# Patient Record
Sex: MALE | Race: ASIAN | NOT HISPANIC OR LATINO | Employment: OTHER | URBAN - METROPOLITAN AREA
[De-identification: names, ages, dates, MRNs, and addresses within clinical notes are randomized per-mention and may not be internally consistent; named-entity substitution may affect disease eponyms.]

---

## 2017-02-07 ENCOUNTER — HOSPITAL ENCOUNTER (OUTPATIENT)
Dept: LAB | Facility: MEDICAL CENTER | Age: 67
End: 2017-02-07
Attending: PHYSICIAN ASSISTANT
Payer: MEDICARE

## 2017-02-07 LAB
EST. AVERAGE GLUCOSE BLD GHB EST-MCNC: 148 MG/DL
HBA1C MFR BLD: 6.8 % (ref 0–5.6)
HCV AB S/CO SERPL IA: NEGATIVE
PSA SERPL DL<=0.01 NG/ML-MCNC: 1.16 NG/ML (ref 0–4)

## 2017-02-07 PROCEDURE — 86803 HEPATITIS C AB TEST: CPT

## 2017-02-07 PROCEDURE — 84153 ASSAY OF PSA TOTAL: CPT

## 2017-02-07 PROCEDURE — 36415 COLL VENOUS BLD VENIPUNCTURE: CPT

## 2017-02-07 PROCEDURE — 83036 HEMOGLOBIN GLYCOSYLATED A1C: CPT

## 2017-02-11 ENCOUNTER — HOSPITAL ENCOUNTER (OUTPATIENT)
Facility: MEDICAL CENTER | Age: 67
End: 2017-02-11
Attending: PHYSICIAN ASSISTANT
Payer: MEDICARE

## 2017-02-11 PROCEDURE — 82274 ASSAY TEST FOR BLOOD FECAL: CPT

## 2017-02-15 LAB — HEMOCCULT STL QL IA: NEGATIVE

## 2017-10-06 ENCOUNTER — HOSPITAL ENCOUNTER (OUTPATIENT)
Dept: LAB | Facility: MEDICAL CENTER | Age: 67
End: 2017-10-06
Attending: FAMILY MEDICINE
Payer: MEDICARE

## 2017-10-06 LAB
ALBUMIN SERPL BCP-MCNC: 4 G/DL (ref 3.2–4.9)
ALBUMIN/GLOB SERPL: 1.3 G/DL
ALP SERPL-CCNC: 70 U/L (ref 30–99)
ALT SERPL-CCNC: 18 U/L (ref 2–50)
ANION GAP SERPL CALC-SCNC: 8 MMOL/L (ref 0–11.9)
APPEARANCE UR: CLEAR
AST SERPL-CCNC: 19 U/L (ref 12–45)
BASOPHILS # BLD AUTO: 1.9 % (ref 0–1.8)
BASOPHILS # BLD: 0.11 K/UL (ref 0–0.12)
BILIRUB SERPL-MCNC: 0.6 MG/DL (ref 0.1–1.5)
BILIRUB UR QL STRIP.AUTO: NEGATIVE
BUN SERPL-MCNC: 17 MG/DL (ref 8–22)
CALCIUM SERPL-MCNC: 9.6 MG/DL (ref 8.5–10.5)
CHLORIDE SERPL-SCNC: 105 MMOL/L (ref 96–112)
CHOLEST SERPL-MCNC: 148 MG/DL (ref 100–199)
CO2 SERPL-SCNC: 26 MMOL/L (ref 20–33)
COLOR UR: YELLOW
CREAT SERPL-MCNC: 0.88 MG/DL (ref 0.5–1.4)
CREAT UR-MCNC: 176.1 MG/DL
EOSINOPHIL # BLD AUTO: 0.28 K/UL (ref 0–0.51)
EOSINOPHIL NFR BLD: 4.7 % (ref 0–6.9)
ERYTHROCYTE [DISTWIDTH] IN BLOOD BY AUTOMATED COUNT: 50.5 FL (ref 35.9–50)
EST. AVERAGE GLUCOSE BLD GHB EST-MCNC: 163 MG/DL
GFR SERPL CREATININE-BSD FRML MDRD: >60 ML/MIN/1.73 M 2
GLOBULIN SER CALC-MCNC: 3.1 G/DL (ref 1.9–3.5)
GLUCOSE SERPL-MCNC: 109 MG/DL (ref 65–99)
GLUCOSE UR STRIP.AUTO-MCNC: >=1000 MG/DL
HBA1C MFR BLD: 7.3 % (ref 0–5.6)
HCT VFR BLD AUTO: 43.6 % (ref 42–52)
HDLC SERPL-MCNC: 39 MG/DL
HGB BLD-MCNC: 13.2 G/DL (ref 14–18)
IMM GRANULOCYTES # BLD AUTO: 0.01 K/UL (ref 0–0.11)
IMM GRANULOCYTES NFR BLD AUTO: 0.2 % (ref 0–0.9)
KETONES UR STRIP.AUTO-MCNC: NEGATIVE MG/DL
LDLC SERPL CALC-MCNC: 75 MG/DL
LEUKOCYTE ESTERASE UR QL STRIP.AUTO: NEGATIVE
LYMPHOCYTES # BLD AUTO: 2.24 K/UL (ref 1–4.8)
LYMPHOCYTES NFR BLD: 37.7 % (ref 22–41)
MCH RBC QN AUTO: 24.9 PG (ref 27–33)
MCHC RBC AUTO-ENTMCNC: 30.3 G/DL (ref 33.7–35.3)
MCV RBC AUTO: 82.3 FL (ref 81.4–97.8)
MICRO URNS: ABNORMAL
MICROALBUMIN UR-MCNC: 1.2 MG/DL
MICROALBUMIN/CREAT UR: 7 MG/G (ref 0–30)
MONOCYTES # BLD AUTO: 0.42 K/UL (ref 0–0.85)
MONOCYTES NFR BLD AUTO: 7.1 % (ref 0–13.4)
NEUTROPHILS # BLD AUTO: 2.88 K/UL (ref 1.82–7.42)
NEUTROPHILS NFR BLD: 48.4 % (ref 44–72)
NITRITE UR QL STRIP.AUTO: NEGATIVE
NRBC # BLD AUTO: 0 K/UL
NRBC BLD AUTO-RTO: 0 /100 WBC
PH UR STRIP.AUTO: 5 [PH]
PLATELET # BLD AUTO: 240 K/UL (ref 164–446)
PMV BLD AUTO: 12 FL (ref 9–12.9)
POTASSIUM SERPL-SCNC: 4.4 MMOL/L (ref 3.6–5.5)
PROT SERPL-MCNC: 7.1 G/DL (ref 6–8.2)
PROT UR QL STRIP: NEGATIVE MG/DL
PSA SERPL-MCNC: 1.94 NG/ML (ref 0–4)
RBC # BLD AUTO: 5.3 M/UL (ref 4.7–6.1)
RBC UR QL AUTO: NEGATIVE
SODIUM SERPL-SCNC: 139 MMOL/L (ref 135–145)
SP GR UR STRIP.AUTO: 1.04
TRIGL SERPL-MCNC: 169 MG/DL (ref 0–149)
TSH SERPL DL<=0.005 MIU/L-ACNC: 2.27 UIU/ML (ref 0.3–3.7)
UROBILINOGEN UR STRIP.AUTO-MCNC: 0.2 MG/DL
WBC # BLD AUTO: 5.9 K/UL (ref 4.8–10.8)

## 2017-10-06 PROCEDURE — 80053 COMPREHEN METABOLIC PANEL: CPT

## 2017-10-06 PROCEDURE — 80061 LIPID PANEL: CPT

## 2017-10-06 PROCEDURE — 83036 HEMOGLOBIN GLYCOSYLATED A1C: CPT

## 2017-10-06 PROCEDURE — 84153 ASSAY OF PSA TOTAL: CPT

## 2017-10-06 PROCEDURE — 81003 URINALYSIS AUTO W/O SCOPE: CPT

## 2017-10-06 PROCEDURE — 85025 COMPLETE CBC W/AUTO DIFF WBC: CPT

## 2017-10-06 PROCEDURE — 82043 UR ALBUMIN QUANTITATIVE: CPT

## 2017-10-06 PROCEDURE — 84443 ASSAY THYROID STIM HORMONE: CPT

## 2017-10-06 PROCEDURE — 82570 ASSAY OF URINE CREATININE: CPT

## 2017-10-06 PROCEDURE — 36415 COLL VENOUS BLD VENIPUNCTURE: CPT

## 2017-10-16 ENCOUNTER — OFFICE VISIT (OUTPATIENT)
Dept: CARDIOLOGY | Facility: MEDICAL CENTER | Age: 67
End: 2017-10-16
Payer: MEDICARE

## 2017-10-16 VITALS
DIASTOLIC BLOOD PRESSURE: 88 MMHG | OXYGEN SATURATION: 96 % | WEIGHT: 170 LBS | HEIGHT: 64 IN | BODY MASS INDEX: 29.02 KG/M2 | HEART RATE: 66 BPM | SYSTOLIC BLOOD PRESSURE: 148 MMHG

## 2017-10-16 DIAGNOSIS — E78.2 MIXED HYPERLIPIDEMIA: ICD-10-CM

## 2017-10-16 DIAGNOSIS — E11.9 TYPE 2 DIABETES MELLITUS WITHOUT COMPLICATION, WITHOUT LONG-TERM CURRENT USE OF INSULIN (HCC): ICD-10-CM

## 2017-10-16 DIAGNOSIS — I10 ESSENTIAL HYPERTENSION: ICD-10-CM

## 2017-10-16 DIAGNOSIS — R06.02 SOB (SHORTNESS OF BREATH): ICD-10-CM

## 2017-10-16 DIAGNOSIS — R00.2 PALPITATIONS: ICD-10-CM

## 2017-10-16 DIAGNOSIS — I20.0 UNSTABLE ANGINA PECTORIS (HCC): ICD-10-CM

## 2017-10-16 PROBLEM — I25.9 CHEST PAIN DUE TO MYOCARDIAL ISCHEMIA: Status: ACTIVE | Noted: 2017-10-16

## 2017-10-16 LAB — EKG IMPRESSION: NORMAL

## 2017-10-16 PROCEDURE — 99204 OFFICE O/P NEW MOD 45 MIN: CPT | Performed by: INTERNAL MEDICINE

## 2017-10-16 PROCEDURE — 93000 ELECTROCARDIOGRAM COMPLETE: CPT | Performed by: INTERNAL MEDICINE

## 2017-10-16 RX ORDER — LISINOPRIL 20 MG/1
20 TABLET ORAL 2 TIMES DAILY
COMMUNITY
End: 2017-11-29 | Stop reason: SDUPTHER

## 2017-10-16 RX ORDER — PRAVASTATIN SODIUM 10 MG
10 TABLET ORAL NIGHTLY
COMMUNITY
End: 2017-10-16

## 2017-10-16 RX ORDER — ACETAMINOPHEN 160 MG
TABLET,DISINTEGRATING ORAL
COMMUNITY

## 2017-10-16 RX ORDER — OMEPRAZOLE 20 MG/1
20 CAPSULE, DELAYED RELEASE ORAL DAILY
COMMUNITY

## 2017-10-16 RX ORDER — OMEGA-3S/DHA/EPA/FISH OIL 1000-1400
CAPSULE,DELAYED RELEASE (ENTERIC COATED) ORAL
COMMUNITY
End: 2018-11-27

## 2017-10-16 RX ORDER — ATORVASTATIN CALCIUM 40 MG/1
40 TABLET, FILM COATED ORAL EVERY EVENING
Qty: 30 TAB | Refills: 11 | Status: SHIPPED | OUTPATIENT
Start: 2017-10-16 | End: 2017-10-16

## 2017-10-16 RX ORDER — ATORVASTATIN CALCIUM 40 MG/1
40 TABLET, FILM COATED ORAL EVERY EVENING
Qty: 90 TAB | Refills: 3 | Status: SHIPPED | OUTPATIENT
Start: 2017-10-16 | End: 2018-09-22 | Stop reason: SDUPTHER

## 2017-10-16 RX ORDER — METFORMIN HYDROCHLORIDE 750 MG/1
1000 TABLET, EXTENDED RELEASE ORAL DAILY
COMMUNITY

## 2017-10-16 ASSESSMENT — ENCOUNTER SYMPTOMS
CHILLS: 0
WEAKNESS: 0
LOSS OF CONSCIOUSNESS: 0
CARDIOVASCULAR NEGATIVE: 1
HEMOPTYSIS: 0
FEVER: 0
EYES NEGATIVE: 1
STRIDOR: 0
COUGH: 0
ORTHOPNEA: 0
PALPITATIONS: 0
SHORTNESS OF BREATH: 0
CONSTITUTIONAL NEGATIVE: 1
NEUROLOGICAL NEGATIVE: 1
CLAUDICATION: 0
SORE THROAT: 0
BRUISES/BLEEDS EASILY: 0
HEARTBURN: 1
MUSCULOSKELETAL NEGATIVE: 1
PND: 0
DIZZINESS: 0
WHEEZING: 0
SPUTUM PRODUCTION: 0
RESPIRATORY NEGATIVE: 1

## 2017-10-16 NOTE — PROGRESS NOTES
Called pharmacy per MD to ensure Atorvastatin Rx changed from 30 day supply to 90 day supply as patient requested. Spoke to pharmacist at [facility] on [address], who v/u to change Rx as requested.       SANJAY Santoro RN  [phone]

## 2017-10-16 NOTE — LETTER
[facility]   [address], [address]  [place] [address]  Phone: [phone]  Fax: [phone]              Gilberto Solorzano  1950    Encounter Date: 10/16/2017    Arturo Estrada M.D.          PROGRESS NOTE:  Subjective:   Gilberto Solorzano is a 67 y.o. male who presents today as a new consultation. He has a past medical history of diabetes on metformin for a number of years. He was sent here because he developed a chest pain syndrome describes a heartburn-like sensation in his mid chest but does not seem to have an exertional component. He's also been mildly short of breath with exertion. His ECG today in clinic shows biphasic T waves in V2 V3 with flipped T waves in the lateral leads but nothing in one and aVL. He denies high blood pressure. His only complaint again is mild shortness of breath. Otherwise no josé manuel chest pain  And he said the majority of his chest pain syndrome has now resolved since starting on omeprazole.    No past medical history on file.  No past surgical history on file.  History reviewed. No pertinent family history.  History   Smoking Status   • Never Smoker   Smokeless Tobacco   • Never Used     No Known Allergies  Outpatient Encounter Prescriptions as of 10/16/2017   Medication Sig Dispense Refill   • lisinopril (PRINIVIL) 20 MG Tab Take 20 mg by mouth 2 times a day.     • metformin ER (GLUCOPHAGE XR) 750 MG TABLET SR 24 HR Take 750 mg by mouth every day.     • omeprazole (PRILOSEC) 20 MG delayed-release capsule Take 20 mg by mouth every day.     • Multiple Vitamins-Minerals (CENTRUM SILVER PO) Take  by mouth.     • Omega-3 Fatty Acids (FISH OIL ULTRA) 1400 MG Cap Take  by mouth.     • Cholecalciferol (VITAMIN D3) 2000 UNIT Cap Take  by mouth.     • aspirin 81 MG tablet Take 81 mg by mouth every day.     • atorvastatin (LIPITOR) 40 MG Tab Take 1 Tab by mouth every evening. 90 Tab 3   • [DISCONTINUED] pravastatin (PRAVACHOL) 10 MG Tab Take  "10 mg by mouth every evening.     • [DISCONTINUED] atorvastatin (LIPITOR) 40 MG Tab Take 1 Tab by mouth every evening. 30 Tab 11     No facility-administered encounter medications on file as of 10/16/2017.      Review of Systems   Constitutional: Negative.  Negative for chills, fever and malaise/fatigue.   HENT: Negative.  Negative for sore throat.    Eyes: Negative.    Respiratory: Negative.  Negative for cough, hemoptysis, sputum production, shortness of breath, wheezing and stridor.    Cardiovascular: Negative.  Negative for chest pain, palpitations, orthopnea, claudication, leg swelling and PND.   Gastrointestinal: Positive for heartburn.   Genitourinary: Negative.    Musculoskeletal: Negative.    Skin: Negative.    Neurological: Negative.  Negative for dizziness, loss of consciousness and weakness.   Endo/Heme/Allergies: Negative.  Does not bruise/bleed easily.   All other systems reviewed and are negative.       Objective:   /88   Pulse 66   Ht 1.626 m (5' 4\")   Wt 77.1 kg (170 lb)   SpO2 96%   BMI 29.18 kg/m²      Physical Exam   Constitutional: He is oriented to person, place, and time. He appears well-developed and well-nourished. No distress.   HENT:   Head: Normocephalic.   Mouth/Throat: Oropharynx is clear and moist.   Eyes: EOM are normal. Pupils are equal, round, and reactive to light. Right eye exhibits no discharge. Left eye exhibits no discharge. No scleral icterus.   Neck: Normal range of motion. Neck supple. No JVD present. No tracheal deviation present.   Cardiovascular: Normal rate, regular rhythm, S1 normal, S2 normal, normal heart sounds, intact distal pulses and normal pulses.  Exam reveals no gallop, no S3, no S4 and no friction rub.    No murmur heard.   No systolic murmur is present    No diastolic murmur is present   Pulses:       Carotid pulses are 2+ on the right side, and 2+ on the left side.       Radial pulses are 2+ on the right side, and 2+ on the left side.        " Dorsalis pedis pulses are 2+ on the right side, and 2+ on the left side.        Posterior tibial pulses are 2+ on the right side, and 2+ on the left side.   Pulmonary/Chest: Effort normal and breath sounds normal. No respiratory distress. He has no wheezes. He has no rales.   Abdominal: Soft. Bowel sounds are normal. He exhibits no distension and no mass. There is no tenderness. There is no rebound and no guarding.   Musculoskeletal: He exhibits no edema.   Neurological: He is alert and oriented to person, place, and time. No cranial nerve deficit.   Skin: Skin is warm and dry. He is not diaphoretic. No pallor.   Psychiatric: He has a normal mood and affect. His behavior is normal. Judgment and thought content normal.   Nursing note and vitals reviewed.      Assessment:     1. Palpitations  EKG    ECHOCARDIOGRAM COMP W/O CONT    atorvastatin (LIPITOR) 40 MG Tab    DISCONTINUED: atorvastatin (LIPITOR) 40 MG Tab   2. Mixed hyperlipidemia  atorvastatin (LIPITOR) 40 MG Tab   3. Essential hypertension     4. Type 2 diabetes mellitus without complication, without long-term current use of insulin (CMS-HCC)  atorvastatin (LIPITOR) 40 MG Tab   5. SOB (shortness of breath)  NM-CARDIAC PET    ECHOCARDIOGRAM COMP W/O CONT    atorvastatin (LIPITOR) 40 MG Tab    DISCONTINUED: atorvastatin (LIPITOR) 40 MG Tab   6. Unstable angina pectoris (CMS-[facility])  NM-CARDIAC PET       Medical Decision Making:  Today's Assessment / Status / Plan:     67-year-old diabetic male with an abnormal ECG as well as some chest pain syndrome which could be an ischemia equivalent given his diabetes. Because of the shortness of breath and chest pain I have a high index of suspicion like to get an echocardiogram as well as a nuclear stress test. Because the diabetes I stopped  His pravastatin and we'll switch him to atorvastatin 40. We will get this testing done within the next month I will see him back in 4 weeks.    Thank for you allowing me to take part in  your patient's care, please call should you have any questions or would like to discuss this patient.      Masno Diallo M.D.  [address]  [address]  [place] [id number]  VIA Facsimile: [phone]

## 2017-10-16 NOTE — PROGRESS NOTES
Subjective:   Gilberto Solorzano is a 67 y.o. male who presents today as a new consultation. He has a past medical history of diabetes on metformin for a number of years. He was sent here because he developed a chest pain syndrome describes a heartburn-like sensation in his mid chest but does not seem to have an exertional component. He's also been mildly short of breath with exertion. His ECG today in clinic shows biphasic T waves in V2 V3 with flipped T waves in the lateral leads but nothing in one and aVL. He denies high blood pressure. His only complaint again is mild shortness of breath. Otherwise no josé manuel chest pain  And he said the majority of his chest pain syndrome has now resolved since starting on omeprazole.    No past medical history on file.  No past surgical history on file.  History reviewed. No pertinent family history.  History   Smoking Status   • Never Smoker   Smokeless Tobacco   • Never Used     No Known Allergies  Outpatient Encounter Prescriptions as of 10/16/2017   Medication Sig Dispense Refill   • lisinopril (PRINIVIL) 20 MG Tab Take 20 mg by mouth 2 times a day.     • metformin ER (GLUCOPHAGE XR) 750 MG TABLET SR 24 HR Take 750 mg by mouth every day.     • omeprazole (PRILOSEC) 20 MG delayed-release capsule Take 20 mg by mouth every day.     • Multiple Vitamins-Minerals (CENTRUM SILVER PO) Take  by mouth.     • Omega-3 Fatty Acids (FISH OIL ULTRA) 1400 MG Cap Take  by mouth.     • Cholecalciferol (VITAMIN D3) 2000 UNIT Cap Take  by mouth.     • aspirin 81 MG tablet Take 81 mg by mouth every day.     • atorvastatin (LIPITOR) 40 MG Tab Take 1 Tab by mouth every evening. 90 Tab 3   • [DISCONTINUED] pravastatin (PRAVACHOL) 10 MG Tab Take 10 mg by mouth every evening.     • [DISCONTINUED] atorvastatin (LIPITOR) 40 MG Tab Take 1 Tab by mouth every evening. 30 Tab 11     No facility-administered encounter medications on file as of 10/16/2017.      Review of Systems   Constitutional: Negative.   "Negative for chills, fever and malaise/fatigue.   HENT: Negative.  Negative for sore throat.    Eyes: Negative.    Respiratory: Negative.  Negative for cough, hemoptysis, sputum production, shortness of breath, wheezing and stridor.    Cardiovascular: Negative.  Negative for chest pain, palpitations, orthopnea, claudication, leg swelling and PND.   Gastrointestinal: Positive for heartburn.   Genitourinary: Negative.    Musculoskeletal: Negative.    Skin: Negative.    Neurological: Negative.  Negative for dizziness, loss of consciousness and weakness.   Endo/Heme/Allergies: Negative.  Does not bruise/bleed easily.   All other systems reviewed and are negative.       Objective:   /88   Pulse 66   Ht 1.626 m (5' 4\")   Wt 77.1 kg (170 lb)   SpO2 96%   BMI 29.18 kg/m²     Physical Exam   Constitutional: He is oriented to person, place, and time. He appears well-developed and well-nourished. No distress.   HENT:   Head: Normocephalic.   Mouth/Throat: Oropharynx is clear and moist.   Eyes: EOM are normal. Pupils are equal, round, and reactive to light. Right eye exhibits no discharge. Left eye exhibits no discharge. No scleral icterus.   Neck: Normal range of motion. Neck supple. No JVD present. No tracheal deviation present.   Cardiovascular: Normal rate, regular rhythm, S1 normal, S2 normal, normal heart sounds, intact distal pulses and normal pulses.  Exam reveals no gallop, no S3, no S4 and no friction rub.    No murmur heard.   No systolic murmur is present    No diastolic murmur is present   Pulses:       Carotid pulses are 2+ on the right side, and 2+ on the left side.       Radial pulses are 2+ on the right side, and 2+ on the left side.        Dorsalis pedis pulses are 2+ on the right side, and 2+ on the left side.        Posterior tibial pulses are 2+ on the right side, and 2+ on the left side.   Pulmonary/Chest: Effort normal and breath sounds normal. No respiratory distress. He has no wheezes. He has " no rales.   Abdominal: Soft. Bowel sounds are normal. He exhibits no distension and no mass. There is no tenderness. There is no rebound and no guarding.   Musculoskeletal: He exhibits no edema.   Neurological: He is alert and oriented to person, place, and time. No cranial nerve deficit.   Skin: Skin is warm and dry. He is not diaphoretic. No pallor.   Psychiatric: He has a normal mood and affect. His behavior is normal. Judgment and thought content normal.   Nursing note and vitals reviewed.      Assessment:     1. Palpitations  EKG    ECHOCARDIOGRAM COMP W/O CONT    atorvastatin (LIPITOR) 40 MG Tab    DISCONTINUED: atorvastatin (LIPITOR) 40 MG Tab   2. Mixed hyperlipidemia  atorvastatin (LIPITOR) 40 MG Tab   3. Essential hypertension     4. Type 2 diabetes mellitus without complication, without long-term current use of insulin (CMS-HCC)  atorvastatin (LIPITOR) 40 MG Tab   5. SOB (shortness of breath)  NM-CARDIAC PET    ECHOCARDIOGRAM COMP W/O CONT    atorvastatin (LIPITOR) 40 MG Tab    DISCONTINUED: atorvastatin (LIPITOR) 40 MG Tab   6. Unstable angina pectoris (CMS-HCC)  NM-CARDIAC PET       Medical Decision Making:  Today's Assessment / Status / Plan:     67-year-old diabetic male with an abnormal ECG as well as some chest pain syndrome which could be an ischemia equivalent given his diabetes. Because of the shortness of breath and chest pain I have a high index of suspicion like to get an echocardiogram as well as a nuclear stress test. Because the diabetes I stopped  His pravastatin and we'll switch him to atorvastatin 40. We will get this testing done within the next month I will see him back in 4 weeks.    Thank for you allowing me to take part in your patient's care, please call should you have any questions or would like to discuss this patient.

## 2017-11-06 ENCOUNTER — HOSPITAL ENCOUNTER (OUTPATIENT)
Dept: CARDIOLOGY | Facility: MEDICAL CENTER | Age: 67
End: 2017-11-06
Attending: INTERNAL MEDICINE
Payer: MEDICARE

## 2017-11-06 ENCOUNTER — HOSPITAL ENCOUNTER (OUTPATIENT)
Dept: RADIOLOGY | Facility: MEDICAL CENTER | Age: 67
End: 2017-11-06
Attending: INTERNAL MEDICINE
Payer: MEDICARE

## 2017-11-06 DIAGNOSIS — I20.0 UNSTABLE ANGINA PECTORIS (HCC): ICD-10-CM

## 2017-11-06 DIAGNOSIS — R00.2 PALPITATIONS: ICD-10-CM

## 2017-11-06 DIAGNOSIS — R06.02 SOB (SHORTNESS OF BREATH): ICD-10-CM

## 2017-11-06 LAB
LV EJECT FRACT  99904: 60
LV EJECT FRACT MOD 2C 99903: 62.68
LV EJECT FRACT MOD 4C 99902: 58.66
LV EJECT FRACT MOD BP 99901: 60.79

## 2017-11-06 PROCEDURE — A9555 RB82 RUBIDIUM: HCPCS

## 2017-11-06 PROCEDURE — 93306 TTE W/DOPPLER COMPLETE: CPT

## 2017-11-06 PROCEDURE — 700111 HCHG RX REV CODE 636 W/ 250 OVERRIDE (IP)

## 2017-11-06 PROCEDURE — 93306 TTE W/DOPPLER COMPLETE: CPT | Mod: 26 | Performed by: INTERNAL MEDICINE

## 2017-11-06 NOTE — PROCEDURES
REFERRING PHYSICIAN:  Arturo Estrada MD     AGE:  67.  GENDER:  Male.  HEIGHT:  64 inches.  WEIGHT:  170 pounds.  BMI:       INDICATIONS:  Chest discomfort and dyspnea.     MEDICATIONS:       PROCEDURE:  The patient reviewed and signed the acknowledgement for testing   form.  The patient was in a fasting state and was properly prepared for   testing.  An intravenous line was inserted and a flush of normal saline   followed to insure line patency.     A transmission scan was acquired for attenuation correction using the internal   Germanium sources.  The patient was then administered 25.0 mCi of   Rubidium-82.  Approximately 90 seconds after the infusion, resting imagine   were obtained with ECG-gating.  Following the resting series, the patient   administered 43 mg of dipyridamole over four minutes.  The blood pressure,   heart rate and ECG were monitored and recorded.  After the dipyridamole   infusion was completed, another transmission scan for attenuation correction   was obtained.  The patient was then administered 25.0 mCi of Rubidium-82.    Approximately 90 seconds after the infusion, Peak stress images were obtained   with ECG-gating.     CLINICAL RESPONSE:  Resting blood pressure was 118 with a heart rate of 109.    Immediately post-dipyridamole infusion the blood pressure was 130 with a heart   rate of 95.  After a recovery period the blood pressure was 116 with a heart   rate of 80.     The patient experienced shortness of breath symptoms during testing.     ELECTROCARDIOGRAPHIC FINDINGS:  The patient's resting electrocardiogram   revealed a normal sinus rhythm with right axis deviation and possible anterior   septal MI of indeterminate age.  Does have borderline short CA interval, no   definite delta waves were noted.  Patient did have a borderline IVCD.  Patient   developed approximately 2 mm of downsloping ST segment depression during   dipyridamole stress.  There was no significant change over  the patient's   resting ST segment depression.    SCINTOGRAPHIC FINDINGS:  POST-STRESS IMAGES:  Post-stress images revealed a severe perfusion   abnormality of the apex and anterior septum.  This appears to be more severe   than normal apical thinning.    REST IMAGES:  There was significant nearly complete improvement of the apical   defect at rest.    GATED WALL MOTION FINDINGS:  Patient's resting ejection fraction was 66%.    This chris appropriately to 67% during dipyridamole stress.  Overall, wall   motion appeared to be normal, though it was difficult to evaluate the apex due   to the profound perfusion abnormality.    CONCLUSIONS AND IMPRESSIONS:  1.  Dipyridamole stress positive for dyspnea, but without diagnostic EKG   changes due to the patient's resting EKG abnormalities.  2.  Perfusion images are consistent with a moderate-sized area of severe   apical ischemia.  Minimal if any infarction is present.  3.  Gated wall motion study revealed normal ejection fraction, which chris   appropriately with dipyridamole stress.  No definite wall motion abnormalities   were noted.       ____________________________________     MD NICKI MUSE / FATUMA    DD:  11/06/2017 12:24:33  DT:  11/06/2017 12:44:17    D#:  [id number]  Job#:  [id number]

## 2017-11-14 ENCOUNTER — APPOINTMENT (OUTPATIENT)
Dept: SOCIAL WORK | Facility: CLINIC | Age: 67
End: 2017-11-14
Payer: MEDICARE

## 2017-11-14 PROCEDURE — 90662 IIV NO PRSV INCREASED AG IM: CPT | Performed by: REGISTERED NURSE

## 2017-11-14 PROCEDURE — G0008 ADMIN INFLUENZA VIRUS VAC: HCPCS | Performed by: REGISTERED NURSE

## 2017-11-15 ENCOUNTER — OFFICE VISIT (OUTPATIENT)
Dept: CARDIOLOGY | Facility: MEDICAL CENTER | Age: 67
End: 2017-11-15
Payer: MEDICARE

## 2017-11-15 ENCOUNTER — TELEPHONE (OUTPATIENT)
Dept: CARDIOLOGY | Facility: MEDICAL CENTER | Age: 67
End: 2017-11-15

## 2017-11-15 VITALS
OXYGEN SATURATION: 97 % | DIASTOLIC BLOOD PRESSURE: 80 MMHG | HEART RATE: 78 BPM | WEIGHT: 172 LBS | SYSTOLIC BLOOD PRESSURE: 162 MMHG | HEIGHT: 64 IN | BODY MASS INDEX: 29.37 KG/M2

## 2017-11-15 DIAGNOSIS — I10 ESSENTIAL HYPERTENSION: ICD-10-CM

## 2017-11-15 DIAGNOSIS — R06.02 SOB (SHORTNESS OF BREATH): ICD-10-CM

## 2017-11-15 DIAGNOSIS — E11.9 TYPE 2 DIABETES MELLITUS WITHOUT COMPLICATION, WITHOUT LONG-TERM CURRENT USE OF INSULIN (HCC): ICD-10-CM

## 2017-11-15 DIAGNOSIS — I20.0 UNSTABLE ANGINA PECTORIS (HCC): ICD-10-CM

## 2017-11-15 DIAGNOSIS — E78.2 MIXED HYPERLIPIDEMIA: ICD-10-CM

## 2017-11-15 PROCEDURE — 99214 OFFICE O/P EST MOD 30 MIN: CPT | Performed by: INTERNAL MEDICINE

## 2017-11-15 ASSESSMENT — ENCOUNTER SYMPTOMS
GASTROINTESTINAL NEGATIVE: 1
CHILLS: 0
HEMOPTYSIS: 0
COUGH: 0
WEAKNESS: 0
FEVER: 0
PALPITATIONS: 0
RESPIRATORY NEGATIVE: 1
SPUTUM PRODUCTION: 0
STRIDOR: 0
SHORTNESS OF BREATH: 0
BRUISES/BLEEDS EASILY: 0
SORE THROAT: 0
MUSCULOSKELETAL NEGATIVE: 1
DIZZINESS: 0
LOSS OF CONSCIOUSNESS: 0
CLAUDICATION: 0
CARDIOVASCULAR NEGATIVE: 1
PND: 0
WHEEZING: 0
EYES NEGATIVE: 1
CONSTITUTIONAL NEGATIVE: 1
ORTHOPNEA: 0
NEUROLOGICAL NEGATIVE: 1

## 2017-11-15 NOTE — PROGRESS NOTES
Subjective:   Gilberto Solorzano is a 67 y.o. male who presents today as a follow-up for his chest pain. His chest pain was atypical but he is a diabetic. He underwent a nuclear stress test that had apical severe ischemia. He continues to walk and say sometimes he short of breath and does get somewhat diaphoretic but now is denying chest pain. He continues on his medications. He is leaving for a trip for [place] after [place].    History reviewed. No pertinent past medical history.  History reviewed. No pertinent surgical history.  History reviewed. No pertinent family history.  History   Smoking Status   • Never Smoker   Smokeless Tobacco   • Never Used     No Known Allergies  Outpatient Encounter Prescriptions as of 11/15/2017   Medication Sig Dispense Refill   • lisinopril (PRINIVIL) 20 MG Tab Take 20 mg by mouth 2 times a day.     • metformin ER (GLUCOPHAGE XR) 750 MG TABLET SR 24 HR Take 750 mg by mouth every day.     • omeprazole (PRILOSEC) 20 MG delayed-release capsule Take 20 mg by mouth every day.     • Multiple Vitamins-Minerals (CENTRUM SILVER PO) Take  by mouth.     • Omega-3 Fatty Acids (FISH OIL ULTRA) 1400 MG Cap Take  by mouth.     • Cholecalciferol (VITAMIN D3) 2000 UNIT Cap Take  by mouth.     • aspirin 81 MG tablet Take 81 mg by mouth every day.     • atorvastatin (LIPITOR) 40 MG Tab Take 1 Tab by mouth every evening. 90 Tab 3     No facility-administered encounter medications on file as of 11/15/2017.      Review of Systems   Constitutional: Negative.  Negative for chills, fever and malaise/fatigue.   HENT: Negative.  Negative for sore throat.    Eyes: Negative.    Respiratory: Negative.  Negative for cough, hemoptysis, sputum production, shortness of breath, wheezing and stridor.    Cardiovascular: Negative.  Negative for chest pain, palpitations, orthopnea, claudication, leg swelling and PND.   Gastrointestinal: Negative.    Genitourinary: Negative.    Musculoskeletal: Negative.    Skin:  "Negative.    Neurological: Negative.  Negative for dizziness, loss of consciousness and weakness.   Endo/Heme/Allergies: Negative.  Does not bruise/bleed easily.   All other systems reviewed and are negative.       Objective:   BP (!) 162/80   Pulse 78   Ht 1.626 m (5' 4\")   Wt 78 kg (172 lb)   SpO2 97%   BMI 29.52 kg/m²     Physical Exam   Constitutional: He is oriented to person, place, and time. He appears well-developed and well-nourished. No distress.   HENT:   Head: Normocephalic.   Mouth/Throat: Oropharynx is clear and moist.   Eyes: EOM are normal. Pupils are equal, round, and reactive to light. Right eye exhibits no discharge. Left eye exhibits no discharge. No scleral icterus.   Neck: Normal range of motion. Neck supple. No JVD present. No tracheal deviation present.   Cardiovascular: Normal rate, regular rhythm, S1 normal, S2 normal, normal heart sounds, intact distal pulses and normal pulses.  Exam reveals no gallop, no S3, no S4 and no friction rub.    No murmur heard.   No systolic murmur is present    No diastolic murmur is present   Pulses:       Carotid pulses are 2+ on the right side, and 2+ on the left side.       Radial pulses are 2+ on the right side, and 2+ on the left side.        Dorsalis pedis pulses are 2+ on the right side, and 2+ on the left side.        Posterior tibial pulses are 2+ on the right side, and 2+ on the left side.   Pulmonary/Chest: Effort normal and breath sounds normal. No respiratory distress. He has no wheezes. He has no rales.   Abdominal: Soft. Bowel sounds are normal. He exhibits no distension and no mass. There is no tenderness. There is no rebound and no guarding.   Musculoskeletal: He exhibits no edema.   Neurological: He is alert and oriented to person, place, and time. No cranial nerve deficit.   Skin: Skin is warm and dry. He is not diaphoretic. No pallor.   Psychiatric: He has a normal mood and affect. His behavior is normal. Judgment and thought content " normal.   Nursing note and vitals reviewed.      Assessment:     1. Unstable angina pectoris (CMS-[facility])     2. Essential hypertension     3. Mixed hyperlipidemia     4. SOB (shortness of breath)     5. Type 2 diabetes mellitus without complication, without long-term current use of insulin (CMS-[facility])         Medical Decision Making:  Today's Assessment / Status / Plan:     67-year-old male with positive nuclear stress test in the apical distribution. I discussed with him the risks benefits and alternatives of moving forward. Based on his risk factors and his stress test I highly recommend a cardiac catheterization at this point. Furthermore I would like to get a completed within the next week. Strict return precautions to the ER were also provided. Hopefully we can get this done either the end of this week or early next week. He understands and would like to proceed as well.    Thank for you allowing me to take part in your patient's care, please call should you have any questions or would like to discuss this patient.

## 2017-11-17 DIAGNOSIS — Z01.810 PRE-OPERATIVE CARDIOVASCULAR EXAMINATION: ICD-10-CM

## 2017-11-17 DIAGNOSIS — Z01.812 PRE-OPERATIVE LABORATORY EXAMINATION: ICD-10-CM

## 2017-11-17 LAB
ANION GAP SERPL CALC-SCNC: 9 MMOL/L (ref 0–11.9)
BUN SERPL-MCNC: 16 MG/DL (ref 8–22)
CALCIUM SERPL-MCNC: 9.4 MG/DL (ref 8.5–10.5)
CHLORIDE SERPL-SCNC: 105 MMOL/L (ref 96–112)
CO2 SERPL-SCNC: 25 MMOL/L (ref 20–33)
CREAT SERPL-MCNC: 0.72 MG/DL (ref 0.5–1.4)
EKG IMPRESSION: NORMAL
ERYTHROCYTE [DISTWIDTH] IN BLOOD BY AUTOMATED COUNT: 52.5 FL (ref 35.9–50)
GFR SERPL CREATININE-BSD FRML MDRD: >60 ML/MIN/1.73 M 2
GLUCOSE SERPL-MCNC: 107 MG/DL (ref 65–99)
HCT VFR BLD AUTO: 40.7 % (ref 42–52)
HGB BLD-MCNC: 12.6 G/DL (ref 14–18)
INR PPP: 1.24 (ref 0.87–1.13)
MCH RBC QN AUTO: 25.4 PG (ref 27–33)
MCHC RBC AUTO-ENTMCNC: 31 G/DL (ref 33.7–35.3)
MCV RBC AUTO: 81.9 FL (ref 81.4–97.8)
PLATELET # BLD AUTO: 189 K/UL (ref 164–446)
PMV BLD AUTO: 13.2 FL (ref 9–12.9)
POTASSIUM SERPL-SCNC: 4.2 MMOL/L (ref 3.6–5.5)
PROTHROMBIN TIME: 15.3 SEC (ref 12–14.6)
RBC # BLD AUTO: 4.97 M/UL (ref 4.7–6.1)
SODIUM SERPL-SCNC: 139 MMOL/L (ref 135–145)
WBC # BLD AUTO: 5.8 K/UL (ref 4.8–10.8)

## 2017-11-17 PROCEDURE — 80048 BASIC METABOLIC PNL TOTAL CA: CPT

## 2017-11-17 PROCEDURE — 93010 ELECTROCARDIOGRAM REPORT: CPT | Performed by: INTERNAL MEDICINE

## 2017-11-17 PROCEDURE — 93005 ELECTROCARDIOGRAM TRACING: CPT

## 2017-11-17 PROCEDURE — 85027 COMPLETE CBC AUTOMATED: CPT

## 2017-11-17 PROCEDURE — 85610 PROTHROMBIN TIME: CPT

## 2017-11-17 PROCEDURE — 36415 COLL VENOUS BLD VENIPUNCTURE: CPT

## 2017-11-20 ENCOUNTER — HOSPITAL ENCOUNTER (OUTPATIENT)
Facility: MEDICAL CENTER | Age: 67
End: 2017-11-20
Attending: INTERNAL MEDICINE | Admitting: INTERNAL MEDICINE
Payer: MEDICARE

## 2017-11-20 VITALS
BODY MASS INDEX: 34.11 KG/M2 | HEIGHT: 60 IN | SYSTOLIC BLOOD PRESSURE: 165 MMHG | HEART RATE: 62 BPM | DIASTOLIC BLOOD PRESSURE: 84 MMHG | OXYGEN SATURATION: 97 % | WEIGHT: 173.72 LBS | TEMPERATURE: 97.6 F | RESPIRATION RATE: 16 BRPM

## 2017-11-20 PROBLEM — I25.118 CORONARY ARTERY DISEASE OF NATIVE ARTERY WITH STABLE ANGINA PECTORIS (HCC): Status: ACTIVE | Noted: 2017-11-20

## 2017-11-20 LAB — EKG IMPRESSION: NORMAL

## 2017-11-20 PROCEDURE — C1894 INTRO/SHEATH, NON-LASER: HCPCS

## 2017-11-20 PROCEDURE — 307093 HCHG TR BAND RADIAL

## 2017-11-20 PROCEDURE — C1874 STENT, COATED/COV W/DEL SYS: HCPCS

## 2017-11-20 PROCEDURE — C9600 PERC DRUG-EL COR STENT SING: HCPCS | Mod: LC

## 2017-11-20 PROCEDURE — 160002 HCHG RECOVERY MINUTES (STAT)

## 2017-11-20 PROCEDURE — 93458 L HRT ARTERY/VENTRICLE ANGIO: CPT

## 2017-11-20 PROCEDURE — 700102 HCHG RX REV CODE 250 W/ 637 OVERRIDE(OP)

## 2017-11-20 PROCEDURE — C1769 GUIDE WIRE: HCPCS

## 2017-11-20 PROCEDURE — 99152 MOD SED SAME PHYS/QHP 5/>YRS: CPT

## 2017-11-20 PROCEDURE — A9270 NON-COVERED ITEM OR SERVICE: HCPCS

## 2017-11-20 PROCEDURE — A9270 NON-COVERED ITEM OR SERVICE: HCPCS | Performed by: INTERNAL MEDICINE

## 2017-11-20 PROCEDURE — C9600 PERC DRUG-EL COR STENT SING: HCPCS | Mod: LD

## 2017-11-20 PROCEDURE — 700101 HCHG RX REV CODE 250

## 2017-11-20 PROCEDURE — 700102 HCHG RX REV CODE 250 W/ 637 OVERRIDE(OP): Performed by: INTERNAL MEDICINE

## 2017-11-20 PROCEDURE — 93005 ELECTROCARDIOGRAM TRACING: CPT | Performed by: INTERNAL MEDICINE

## 2017-11-20 PROCEDURE — C1876 STENT, NON-COA/NON-COV W/DEL: HCPCS

## 2017-11-20 PROCEDURE — 93010 ELECTROCARDIOGRAM REPORT: CPT | Performed by: INTERNAL MEDICINE

## 2017-11-20 PROCEDURE — 92928 PRQ TCAT PLMT NTRAC ST 1 LES: CPT | Mod: LC

## 2017-11-20 PROCEDURE — 700111 HCHG RX REV CODE 636 W/ 250 OVERRIDE (IP)

## 2017-11-20 PROCEDURE — 99153 MOD SED SAME PHYS/QHP EA: CPT

## 2017-11-20 PROCEDURE — C1887 CATHETER, GUIDING: HCPCS

## 2017-11-20 PROCEDURE — C1725 CATH, TRANSLUMIN NON-LASER: HCPCS

## 2017-11-20 RX ORDER — HEPARIN SODIUM,PORCINE 1000/ML
VIAL (ML) INJECTION
Status: COMPLETED
Start: 2017-11-20 | End: 2017-11-20

## 2017-11-20 RX ORDER — ONDANSETRON 2 MG/ML
4 INJECTION INTRAMUSCULAR; INTRAVENOUS EVERY 4 HOURS PRN
Status: DISCONTINUED | OUTPATIENT
Start: 2017-11-20 | End: 2017-11-20 | Stop reason: HOSPADM

## 2017-11-20 RX ORDER — HALOPERIDOL 5 MG/ML
1 INJECTION INTRAMUSCULAR EVERY 6 HOURS PRN
Status: DISCONTINUED | OUTPATIENT
Start: 2017-11-20 | End: 2017-11-20 | Stop reason: HOSPADM

## 2017-11-20 RX ORDER — DIPHENHYDRAMINE HYDROCHLORIDE 50 MG/ML
25 INJECTION INTRAMUSCULAR; INTRAVENOUS EVERY 6 HOURS PRN
Status: DISCONTINUED | OUTPATIENT
Start: 2017-11-20 | End: 2017-11-20 | Stop reason: HOSPADM

## 2017-11-20 RX ORDER — PRASUGREL 10 MG/1
10 TABLET, FILM COATED ORAL DAILY
Qty: 90 TAB | Refills: 3 | Status: ON HOLD | OUTPATIENT
Start: 2017-11-21 | End: 2017-11-21

## 2017-11-20 RX ORDER — MIDAZOLAM HYDROCHLORIDE 1 MG/ML
INJECTION INTRAMUSCULAR; INTRAVENOUS
Status: COMPLETED
Start: 2017-11-20 | End: 2017-11-20

## 2017-11-20 RX ORDER — ATORVASTATIN CALCIUM 40 MG/1
40 TABLET, FILM COATED ORAL EVERY EVENING
Status: DISCONTINUED | OUTPATIENT
Start: 2017-11-20 | End: 2017-11-20 | Stop reason: HOSPADM

## 2017-11-20 RX ORDER — ACETAMINOPHEN 325 MG/1
325 TABLET ORAL EVERY 4 HOURS PRN
Status: DISCONTINUED | OUTPATIENT
Start: 2017-11-20 | End: 2017-11-20 | Stop reason: HOSPADM

## 2017-11-20 RX ORDER — BIVALIRUDIN 250 MG/5ML
INJECTION, POWDER, LYOPHILIZED, FOR SOLUTION INTRAVENOUS
Status: COMPLETED
Start: 2017-11-20 | End: 2017-11-20

## 2017-11-20 RX ORDER — DEXAMETHASONE SODIUM PHOSPHATE 4 MG/ML
4 INJECTION, SOLUTION INTRA-ARTICULAR; INTRALESIONAL; INTRAMUSCULAR; INTRAVENOUS; SOFT TISSUE
Status: DISCONTINUED | OUTPATIENT
Start: 2017-11-20 | End: 2017-11-20 | Stop reason: HOSPADM

## 2017-11-20 RX ORDER — PRASUGREL 10 MG/1
TABLET, FILM COATED ORAL
Status: COMPLETED
Start: 2017-11-20 | End: 2017-11-20

## 2017-11-20 RX ORDER — SCOLOPAMINE TRANSDERMAL SYSTEM 1 MG/1
1 PATCH, EXTENDED RELEASE TRANSDERMAL
Status: DISCONTINUED | OUTPATIENT
Start: 2017-11-20 | End: 2017-11-20 | Stop reason: HOSPADM

## 2017-11-20 RX ORDER — LIDOCAINE HYDROCHLORIDE 20 MG/ML
INJECTION, SOLUTION INFILTRATION; PERINEURAL
Status: COMPLETED
Start: 2017-11-20 | End: 2017-11-20

## 2017-11-20 RX ORDER — VERAPAMIL HYDROCHLORIDE 2.5 MG/ML
INJECTION, SOLUTION INTRAVENOUS
Status: COMPLETED
Start: 2017-11-20 | End: 2017-11-20

## 2017-11-20 RX ORDER — SODIUM CHLORIDE 9 MG/ML
INJECTION, SOLUTION INTRAVENOUS CONTINUOUS
Status: ACTIVE | OUTPATIENT
Start: 2017-11-20 | End: 2017-11-20

## 2017-11-20 RX ORDER — LISINOPRIL 20 MG/1
20 TABLET ORAL 2 TIMES DAILY
Status: DISCONTINUED | OUTPATIENT
Start: 2017-11-20 | End: 2017-11-20 | Stop reason: HOSPADM

## 2017-11-20 RX ORDER — PRASUGREL 10 MG/1
10 TABLET, FILM COATED ORAL DAILY
Status: DISCONTINUED | OUTPATIENT
Start: 2017-11-21 | End: 2017-11-20 | Stop reason: HOSPADM

## 2017-11-20 RX ORDER — ASPIRIN 325 MG
TABLET ORAL
Status: COMPLETED
Start: 2017-11-20 | End: 2017-11-20

## 2017-11-20 RX ORDER — NITROGLYCERIN 0.4 MG/1
0.4 TABLET SUBLINGUAL
Status: DISCONTINUED | OUTPATIENT
Start: 2017-11-20 | End: 2017-11-20 | Stop reason: HOSPADM

## 2017-11-20 RX ORDER — SODIUM CHLORIDE 9 MG/ML
INJECTION, SOLUTION INTRAVENOUS
Status: DISCONTINUED | OUTPATIENT
Start: 2017-11-20 | End: 2017-11-20

## 2017-11-20 RX ORDER — PRASUGREL 10 MG/1
60 TABLET, FILM COATED ORAL ONCE
Status: DISCONTINUED | OUTPATIENT
Start: 2017-11-20 | End: 2017-11-20

## 2017-11-20 RX ADMIN — FENTANYL CITRATE 100 MCG: 50 INJECTION, SOLUTION INTRAMUSCULAR; INTRAVENOUS at 08:06

## 2017-11-20 RX ADMIN — LIDOCAINE HYDROCHLORIDE: 20 INJECTION, SOLUTION INFILTRATION; PERINEURAL at 07:43

## 2017-11-20 RX ADMIN — SODIUM CHLORIDE: 9 INJECTION, SOLUTION INTRAVENOUS at 08:45

## 2017-11-20 RX ADMIN — LISINOPRIL 20 MG: 20 TABLET ORAL at 12:43

## 2017-11-20 RX ADMIN — PRASUGREL 60 MG: 10 TABLET, FILM COATED ORAL at 08:17

## 2017-11-20 RX ADMIN — BIVALIRUDIN 250 MG: 250 INJECTION, POWDER, LYOPHILIZED, FOR SOLUTION INTRAVENOUS at 08:01

## 2017-11-20 RX ADMIN — Medication 60 MG: at 08:17

## 2017-11-20 RX ADMIN — HEPARIN SODIUM 2000 UNITS: 200 INJECTION, SOLUTION INTRAVENOUS at 08:01

## 2017-11-20 RX ADMIN — VERAPAMIL HYDROCHLORIDE 2.5 MG: 2.5 INJECTION, SOLUTION INTRAVENOUS at 07:43

## 2017-11-20 RX ADMIN — MIDAZOLAM 2 MG: 1 INJECTION INTRAMUSCULAR; INTRAVENOUS at 08:06

## 2017-11-20 RX ADMIN — MIDAZOLAM 2 MG: 1 INJECTION INTRAMUSCULAR; INTRAVENOUS at 08:14

## 2017-11-20 RX ADMIN — NITROGLYCERIN 10 ML: 20 INJECTION INTRAVENOUS at 07:43

## 2017-11-20 RX ADMIN — SODIUM CHLORIDE: 9 INJECTION, SOLUTION INTRAVENOUS at 06:45

## 2017-11-20 RX ADMIN — HEPARIN SODIUM: 1000 INJECTION, SOLUTION INTRAVENOUS; SUBCUTANEOUS at 07:43

## 2017-11-20 RX ADMIN — ASPIRIN 325 MG: 325 TABLET, FILM COATED ORAL at 08:17

## 2017-11-20 ASSESSMENT — PAIN SCALES - GENERAL
PAINLEVEL_OUTOF10: 0

## 2017-11-20 NOTE — DISCHARGE INSTRUCTIONS
ACTIVITY: Rest and take it easy for the first 24 hours.  A responsible adult is recommended to remain with you during that time.  It is normal to feel sleepy.  We encourage you to not do anything that requires balance, judgment or coordination.    MILD FLU-LIKE SYMPTOMS ARE NORMAL. YOU MAY EXPERIENCE GENERALIZED MUSCLE ACHES, THROAT IRRITATION, HEADACHE AND/OR SOME NAUSEA.    FOR 24 HOURS DO NOT:  Drive, operate machinery or run household appliances.  Drink beer or alcoholic beverages.   Make important decisions or sign legal documents.    SPECIAL INSTRUCTIONS: *KEEP INCISION DRY FOR 24 HRS**    DIET: To avoid nausea, slowly advance diet as tolerated, avoiding spicy or greasy foods for the first day.  Add more substantial food to your diet according to your physician's instructions.  Babies can be fed formula or breast milk as soon as they are hungry.  INCREASE FLUIDS AND FIBER TO AVOID CONSTIPATION.    SURGICAL DRESSING/BATHING: *MAY SHOWER TOMORROW AFTERNOON THEN MAY REMOVE DRESSING**    FOLLOW-UP APPOINTMENT:  A follow-up appointment should be arranged with your doctor in *DR FULLER   [phone]**; call to schedule.    You should CALL YOUR PHYSICIAN if you develop:  Fever greater than 101 degrees F.  Pain not relieved by medication, or persistent nausea or vomiting.  Excessive bleeding (blood soaking through dressing) or unexpected drainage from the wound.  Extreme redness or swelling around the incision site, drainage of pus or foul smelling drainage.  Inability to urinate or empty your bladder within 8 hours.  Problems with breathing or chest pain.    You should call 911 if you develop problems with breathing or chest pain.  If you are unable to contact your doctor or surgical center, you should go to the nearest emergency room or urgent care center.  Physician's telephone #: *[phone]**    If any questions arise, call your doctor.  If your doctor is not available, please feel free to call the Surgical  Center at [phone].  The Center is open Monday through Friday from 7AM to 7PM.  You can also call the HEALTH HOTLINE open 24 hours/day, 7 days/week and speak to a nurse at [phone], or toll free at [phone].    A registered nurse may call you a few days after your surgery to see how you are doing after your procedure.    MEDICATIONS: Resume taking daily medication.  Take prescribed pain medication with food.  If no medication is prescribed, you may take non-aspirin pain medication if needed.  PAIN MEDICATION CAN BE VERY CONSTIPATING.  Take a stool softener or laxative such as senokot, pericolace, or milk of magnesia if needed.    Prescription given for *RX GIVEN**.     If your physician has prescribed pain medication that includes Acetaminophen (Tylenol), do not take additional Acetaminophen (Tylenol) while taking the prescribed medication.    Depression / Suicide Risk    As you are discharged from this [facility] facility, it is important to learn how to keep safe from harming yourself.    Recognize the warning signs:  · Abrupt changes in personality, positive or negative- including increase in energy   · Giving away possessions  · Change in eating patterns- significant weight changes-  positive or negative  · Change in sleeping patterns- unable to sleep or sleeping all the time   · Unwillingness or inability to communicate  · Depression  · Unusual sadness, discouragement and loneliness  · Talk of wanting to die  · Neglect of personal appearance   · Rebelliousness- reckless behavior  · Withdrawal from people/activities they love  · Confusion- inability to concentrate     If you or a loved one observes any of these behaviors or has concerns about self-harm, here's what you can do:  · Talk about it- your feelings and reasons for harming yourself  · Remove any means that you might use to hurt yourself (examples: pills, rope, extension cords, firearm)  · Get professional help from the community  (Mental Health, Substance Abuse, psychological counseling)  · Do not be alone:Call your Safe Contact- someone whom you trust who will be there for you.  · Call your local CRISIS HOTLINE 894-2660 or 365-370-9738  · Call your local Children's Mobile Crisis Response Team [place] (786) 669-7340 or www.House Party  · Call the toll free National Suicide Prevention Hotlines   · National Suicide Prevention Lifeline 242-973-LNAP (8581)  [place] Hope Line Network 800-SUICIDE (770-7800)    Radial Catherization Discharge Instructions      · Do not subject hand/arm to any forceful movements for 24 hours    i.e. supporting weight when rising from the chair or bed.   · Do not drive a car for 24 hours  · You may remove the dressing tomorrow  · You may shower on the day following your procedure.  Do not take a tub bath or submerge the puncture site in water for 3 days following the procedure.  · No Lifting more than 3-5 pounds with affected wrist for 5 days  · Follow up with Dr. HOLLIS**  2-4 weeks.  · Increase fluids for 2 days post procedure.  · Continue all previous medications unless otherwise instructed.    If bleeding should occur following discharge:  · Sit down and apply firm pressure to site with your fingers for 10 minutes  · If the bleeding stops, continue to sit quietly, keeping your wrist straight for 2 hours.  Notify physician as soon as possible ( [phone])  · If bleeding does not stop after 10 minutes, or if there is a large amount of bleeding or spurting, call 911 immediately.  Do not drive yourself to the hospital.

## 2017-11-20 NOTE — PROCEDURES
DATE OF SERVICE:  11/20/2017    REFERRING CARDIOLOGIST:  Arturo Estrada MD    PREOPERATIVE DIAGNOSES:  1.  New onset angina with positive myocardial perfusion imaging indicating   moderate size apical ischemia.    POSTOPERATIVE DIAGNOSES:  1.  Left dominant system with 99% mid left anterior descending artery stenosis   with MARIELLE 2 antegrade flow, 60-70% mid left circumflex and 90% distal left   circumflex/proximal posterior descending artery stenosis.  2.  Normal left ventricular systolic function and wall motion. Ejection   fraction is about 60%.  3.  Status post stenting of the mid left anterior descending artery with   2.5x12 mm Xience Alpine drug eluting stent with 0% restenosis and MARIELLE 3 flow.  4.  Status post stenting of the distal left circumflex with 2.5x12 mm Xience  Alpine drug eluting stent with 0% residual stenosis.  5.  Status post stenting of the the mid left circumflex artery with 4x12 mm bare   metal Vision stent with 0% restenosis and MARIELLE-3 flow.    PROCEDURES:  1.  Left heart catheterization with left ventriculography.  2.  Selective coronary angiography.  3.  Percutaneous coronary intervention of the left anterior descending artery.  4.  Percutaneous coronary intervention of left circumflex artery.    COMPLICATIONS:  None.    DESCRIPTION OF PROCEDURE:  After informed consent was obtained, the patient   was brought to cardiac catheterization laboratory in fasting state.     test was carried out on the right hand and found to be negative.  Right wrist   and right groin was prepped and draped in the usual sterile fashion.  A 2%   Xylocaine was used as a local anesthesia.  Versed and fentanyl were   administered for conscious sedation.    Next, a 6-Yi glide sheath was placed and the right radial artery using   Seldinger technique.  A 2.5 mg verapamil, 100 mcg of nitroglycerin and 4000   units of heparin were administered into the radial sheath.    Next, selective angiography of the  left coronary artery was performed in   multiple views using a 5-[place] TIG catheter. This was followed by selective   angiography of the right coronary in multiple views using the same catheter.    Next, the catheter was then advanced into the left ventricle.  Left   ventricular pressure was recorded.  Left ventriculography was then performed   using 24 mL of contrast injected over 3 seconds.  Left heart pullback was then   performed.    After reviewing diagnostic angiography, it was evident that intervention was   advisable.  Angiomax was started for anticoagulation.  The catheter was then   exchanged for a 6-[place]an EBU 3.5 guide.  A 0.014 BMW guidewire was then   advanced past the stenosis into the distal left anterior descending artery.    The lesion was predilated with a 2.5x12 mm balloon. Some improvement was   noted.  This was followed by placement of 2.5x15 mm Xience Alpine drug-eluting   stent. The stent was then postdilated with 2.5 mm noncompliant balloon up to 16   atmospheres.  Subsequent angiography showed no residual stenosis in the   stented segment with brisk MARIELLE-3 flow.   The guidewire was then directed into the left circumflex artery. The distal LCX lesion   was dilated with 2.5 mm balloon followed by placement of 2.5x12 mm Xience Alpine  drug-eluting stent at 12 atmospheres. Subsequent angiography showed no residual  stenosis at that location.    Then, we directed our attention to the mid left circumflex artery.  The lesion was   predilated with 2.5 mm balloon followed by placement of 4x12 Vision bare metal stent.    The stent was postdilated with noncompliant balloon up to 12 atmospheres.    Subsequent angiography showed no residual stenosis in the stented segment.    The guidewire was then withdrawn back and final angiography was performed which  confirmed good results. The guiding catheter was removed over the wire.  Patient   was given 60 mg prasugrel.  Angiomax was reduced to low dose  infusion.    Radial sheath was then removed.  Hemostasis was obtained using Terumo TR wrist band.    Patient tolerated procedure well.  He left cardiac catheterization laboratory in   stable condition.    FINDINGS:  1.  Hemodynamics:  LV systolic pressure was 177 mmHg, LV end-diastolic pressure of   35 mmHg.  There was no gradient across the aortic valve.  2.  Left ventriculography showed normal size left ventricle with normal wall   motion and overall systolic function.  Ejection fraction range was 70%.  3.  Two-vessel coronary artery disease.    The left main is a large caliber vessel and angiographically free of disease.  It   bifurcates into left anterior descending artery and circumflex artery.    Left anterior descending artery is about 3-3.5 mm in diameter. It has about 30%   proximal stenosis, but not appear to be flow limiting. It then gives off small first   diagonal branch proximally and a medium size second diagonal branch in the   mid segment.  Immediately after the second diagonal branch, there was a 99%   stenosis with MARIELLE-2 antegrade flow at the end of the case.    Left circumflex artery is a large caliber vessel. This is a left dominant system.  The left circumflex artery give rises to 3 medium to large obtuse marginal   branches in the mid and distal segment and terminates into posterior   descending artery.  There was a 60-70% mid circumflex stenosis followed by   90% eccentric stenosis in the distal circumflex at the origin of the posterior   descending artery at the beginning of the case.    Right coronary artery is a nondominant system, somewhat small about 2.5 mm in   diameter.  It gives rise to a conus branch, a couple small to medium sized   acute marginal branches.  There is a 10-20% mid right coronary artery   stenosis.    After intervention, there was no significant residual stenosis in the stented segment   of the left anterior descending artery and left circumflex artery with brisk    MARIELLE-3 flow.    PLAN:  Dual antiplatelet therapy for 1 year.  Aggressive risk factor   modification.  Limit right wrist movement for 48 hours.       ____________________________________     MD JUAN ESCOBAR / FATUMA    DD:  11/20/2017 10:34:27  DT:  11/20/2017 11:26:23    D#:  [id number]  Job#:  [id number]    cc: JAMA CERVANTES MD

## 2017-11-20 NOTE — OR NURSING
0800  REPORT RECEIVED FROM CATH LAB.    0835   PATIENT RECEIVED FROM CATH LAB,  S/P HEART CATH WITH 3 STENTS PLACEMENT VIA RIGHT WRIST.  TR BAND INTACT.  DENIES ANY PAIN.  ANGIOMAX INFUSING AT 3.1CC/HR.  DR NUNEZ SPEAKING TO WIFE AND DAUGHTER.    0900   DR RHOADES CALLED AND STATED THAT PATIENT MIGHT GET DC HOME LATER ON TODAY.    0930   1ST 2CC OF AIR RELEASED FROM TR BAND.  SITE IS CLEAR.  PATIENT VOIDS VIA URINAL.    0950   NEXT 3CC OF AIR RELEASED FROM TR BAND.  SITE IS CLEAR.  EKG DONE POST CATH.    1010  NEXT 3CC OF AIR RELEASED FROM TR BAND.  SITE IS CLEAR.  PATIENT UP TO BATHROOM TO VOID AND HAVING A BM.    1030   NEXT 3CC OF AIR RELEASED FROM TR BAND.  SITE IS CLEAR.    1045   LAST 2CC OF AIR RELEASED FROM TR BAND.  SITE IS CLEAR.    1100   TR BAND REMOVED AND 2X2 WITH TEGADERM AND COBAN APPLIED.    1130  DR RHOADES IS HERE TO CHECK ON PATIENT AND ALSO TO WRITE DC HOME ORDER AT 15:30.  ALSO RX GIVEN TO PATIENT.    1230   CATH SITE CHECKED AND CLEAR.  ANGIOMAX INFUSION COMPLETED.    1330   CATH SITE CHECKED AND CLEAR.      1430   CATH SITE CHECKED AND CLEAR.  PATIENT VOIDS.    1525  DC INSTRUCTIONS GIVEN TO PATIENT AND WIFE.  VERBALIZED UNDERSTANDING OF ALL.  HL DC.  PATIENT DC TO HOME,  VIA WHEELCHAIR,  ESCORTED OUT BY CNA.

## 2017-11-20 NOTE — PROGRESS NOTES
Doing well  No CP or SOB  Hemodynamically stable  Rt wrist good hemostasis no hematoma  Ambulate later if does well, will d/c this afternoon around 3 PM  F/U in 1 week

## 2017-11-21 ENCOUNTER — APPOINTMENT (OUTPATIENT)
Dept: RADIOLOGY | Facility: MEDICAL CENTER | Age: 67
End: 2017-11-21
Attending: EMERGENCY MEDICINE
Payer: MEDICARE

## 2017-11-21 ENCOUNTER — HOSPITAL ENCOUNTER (OUTPATIENT)
Facility: MEDICAL CENTER | Age: 67
End: 2017-11-21
Attending: EMERGENCY MEDICINE | Admitting: INTERNAL MEDICINE
Payer: MEDICARE

## 2017-11-21 ENCOUNTER — TELEPHONE (OUTPATIENT)
Dept: CARDIOLOGY | Facility: MEDICAL CENTER | Age: 67
End: 2017-11-21

## 2017-11-21 ENCOUNTER — PATIENT OUTREACH (OUTPATIENT)
Dept: HEALTH INFORMATION MANAGEMENT | Facility: OTHER | Age: 67
End: 2017-11-21

## 2017-11-21 VITALS
HEIGHT: 64 IN | TEMPERATURE: 97.6 F | WEIGHT: 168 LBS | SYSTOLIC BLOOD PRESSURE: 162 MMHG | OXYGEN SATURATION: 98 % | RESPIRATION RATE: 16 BRPM | HEART RATE: 65 BPM | DIASTOLIC BLOOD PRESSURE: 93 MMHG | BODY MASS INDEX: 28.68 KG/M2

## 2017-11-21 DIAGNOSIS — R06.02 SOB (SHORTNESS OF BREATH): ICD-10-CM

## 2017-11-21 DIAGNOSIS — R07.9 CHEST PAIN, UNSPECIFIED TYPE: ICD-10-CM

## 2017-11-21 PROBLEM — R06.01 ORTHOPNEA: Status: ACTIVE | Noted: 2017-11-21

## 2017-11-21 LAB
ALBUMIN SERPL BCP-MCNC: 4.2 G/DL (ref 3.2–4.9)
ALBUMIN/GLOB SERPL: 1.4 G/DL
ALP SERPL-CCNC: 70 U/L (ref 30–99)
ALT SERPL-CCNC: 21 U/L (ref 2–50)
ANION GAP SERPL CALC-SCNC: 12 MMOL/L (ref 0–11.9)
AST SERPL-CCNC: 19 U/L (ref 12–45)
BASOPHILS # BLD AUTO: 0.6 % (ref 0–1.8)
BASOPHILS # BLD: 0.06 K/UL (ref 0–0.12)
BILIRUB SERPL-MCNC: 0.9 MG/DL (ref 0.1–1.5)
BNP SERPL-MCNC: 196 PG/ML (ref 0–100)
BUN SERPL-MCNC: 8 MG/DL (ref 8–22)
CALCIUM SERPL-MCNC: 9.8 MG/DL (ref 8.5–10.5)
CHLORIDE SERPL-SCNC: 105 MMOL/L (ref 96–112)
CO2 SERPL-SCNC: 24 MMOL/L (ref 20–33)
CREAT SERPL-MCNC: 0.8 MG/DL (ref 0.5–1.4)
EKG IMPRESSION: NORMAL
EOSINOPHIL # BLD AUTO: 0.04 K/UL (ref 0–0.51)
EOSINOPHIL NFR BLD: 0.4 % (ref 0–6.9)
ERYTHROCYTE [DISTWIDTH] IN BLOOD BY AUTOMATED COUNT: 49.5 FL (ref 35.9–50)
GFR SERPL CREATININE-BSD FRML MDRD: >60 ML/MIN/1.73 M 2
GLOBULIN SER CALC-MCNC: 3.1 G/DL (ref 1.9–3.5)
GLUCOSE SERPL-MCNC: 141 MG/DL (ref 65–99)
HCT VFR BLD AUTO: 39.8 % (ref 42–52)
HGB BLD-MCNC: 12.7 G/DL (ref 14–18)
IMM GRANULOCYTES # BLD AUTO: 0.03 K/UL (ref 0–0.11)
IMM GRANULOCYTES NFR BLD AUTO: 0.3 % (ref 0–0.9)
LV EJECT FRACT MOD 2C 99903: 47.32
LV EJECT FRACT MOD 4C 99902: 48.31
LV EJECT FRACT MOD BP 99901: 46.85
LYMPHOCYTES # BLD AUTO: 1.89 K/UL (ref 1–4.8)
LYMPHOCYTES NFR BLD: 19.8 % (ref 22–41)
MCH RBC QN AUTO: 25.1 PG (ref 27–33)
MCHC RBC AUTO-ENTMCNC: 31.9 G/DL (ref 33.7–35.3)
MCV RBC AUTO: 78.7 FL (ref 81.4–97.8)
MONOCYTES # BLD AUTO: 0.46 K/UL (ref 0–0.85)
MONOCYTES NFR BLD AUTO: 4.8 % (ref 0–13.4)
NEUTROPHILS # BLD AUTO: 7.08 K/UL (ref 1.82–7.42)
NEUTROPHILS NFR BLD: 74.1 % (ref 44–72)
NRBC # BLD AUTO: 0 K/UL
NRBC BLD AUTO-RTO: 0 /100 WBC
PLATELET # BLD AUTO: 184 K/UL (ref 164–446)
PMV BLD AUTO: 12.2 FL (ref 9–12.9)
POTASSIUM SERPL-SCNC: 3.7 MMOL/L (ref 3.6–5.5)
PROT SERPL-MCNC: 7.3 G/DL (ref 6–8.2)
RBC # BLD AUTO: 5.06 M/UL (ref 4.7–6.1)
SODIUM SERPL-SCNC: 141 MMOL/L (ref 135–145)
TROPONIN I SERPL-MCNC: 0.05 NG/ML (ref 0–0.04)
WBC # BLD AUTO: 9.6 K/UL (ref 4.8–10.8)

## 2017-11-21 PROCEDURE — 93005 ELECTROCARDIOGRAM TRACING: CPT | Performed by: EMERGENCY MEDICINE

## 2017-11-21 PROCEDURE — 71020 DX-CHEST-2 VIEWS: CPT

## 2017-11-21 PROCEDURE — 85025 COMPLETE CBC W/AUTO DIFF WBC: CPT

## 2017-11-21 PROCEDURE — G0378 HOSPITAL OBSERVATION PER HR: HCPCS

## 2017-11-21 PROCEDURE — 93308 TTE F-UP OR LMTD: CPT | Mod: 26 | Performed by: INTERNAL MEDICINE

## 2017-11-21 PROCEDURE — 84484 ASSAY OF TROPONIN QUANT: CPT

## 2017-11-21 PROCEDURE — 700102 HCHG RX REV CODE 250 W/ 637 OVERRIDE(OP): Performed by: INTERNAL MEDICINE

## 2017-11-21 PROCEDURE — 80053 COMPREHEN METABOLIC PANEL: CPT

## 2017-11-21 PROCEDURE — A9270 NON-COVERED ITEM OR SERVICE: HCPCS | Performed by: INTERNAL MEDICINE

## 2017-11-21 PROCEDURE — 93005 ELECTROCARDIOGRAM TRACING: CPT

## 2017-11-21 PROCEDURE — 99285 EMERGENCY DEPT VISIT HI MDM: CPT

## 2017-11-21 PROCEDURE — 83880 ASSAY OF NATRIURETIC PEPTIDE: CPT

## 2017-11-21 PROCEDURE — 93308 TTE F-UP OR LMTD: CPT

## 2017-11-21 RX ORDER — CLOPIDOGREL 300 MG/1
300 TABLET, FILM COATED ORAL ONCE
Qty: 1 TAB | Refills: 0 | Status: SHIPPED | OUTPATIENT
Start: 2017-11-22 | End: 2017-11-22

## 2017-11-21 RX ORDER — CLOPIDOGREL BISULFATE 75 MG/1
75 TABLET ORAL DAILY
Status: DISCONTINUED | OUTPATIENT
Start: 2017-11-23 | End: 2017-11-21 | Stop reason: HOSPADM

## 2017-11-21 RX ORDER — CLOPIDOGREL BISULFATE 75 MG/1
300 TABLET ORAL ONCE
Status: DISCONTINUED | OUTPATIENT
Start: 2017-11-22 | End: 2017-11-21 | Stop reason: HOSPADM

## 2017-11-21 RX ORDER — METFORMIN HYDROCHLORIDE 750 MG/1
750 TABLET, EXTENDED RELEASE ORAL DAILY
Status: DISCONTINUED | OUTPATIENT
Start: 2017-11-21 | End: 2017-11-21 | Stop reason: HOSPADM

## 2017-11-21 RX ORDER — PRASUGREL 10 MG/1
10 TABLET, FILM COATED ORAL DAILY
Status: DISCONTINUED | OUTPATIENT
Start: 2017-11-21 | End: 2017-11-21

## 2017-11-21 RX ORDER — ATORVASTATIN CALCIUM 40 MG/1
40 TABLET, FILM COATED ORAL EVERY EVENING
Status: DISCONTINUED | OUTPATIENT
Start: 2017-11-21 | End: 2017-11-21 | Stop reason: HOSPADM

## 2017-11-21 RX ORDER — LORAZEPAM 1 MG/1
0.5 TABLET ORAL ONCE
Status: COMPLETED | OUTPATIENT
Start: 2017-11-21 | End: 2017-11-21

## 2017-11-21 RX ORDER — LISINOPRIL 20 MG/1
20 TABLET ORAL 2 TIMES DAILY
Status: DISCONTINUED | OUTPATIENT
Start: 2017-11-21 | End: 2017-11-21 | Stop reason: HOSPADM

## 2017-11-21 RX ORDER — CLOPIDOGREL BISULFATE 75 MG/1
75 TABLET ORAL DAILY
Qty: 90 TAB | Refills: 5 | Status: SHIPPED | OUTPATIENT
Start: 2017-11-23 | End: 2018-11-27

## 2017-11-21 RX ORDER — ATORVASTATIN CALCIUM 20 MG/1
40 TABLET, FILM COATED ORAL EVERY EVENING
Status: DISCONTINUED | OUTPATIENT
Start: 2017-11-21 | End: 2017-11-21

## 2017-11-21 RX ORDER — CLOPIDOGREL BISULFATE 75 MG/1
75 TABLET ORAL DAILY
Qty: 30 TAB | Refills: 5 | Status: SHIPPED | OUTPATIENT
Start: 2017-11-23 | End: 2017-11-21

## 2017-11-21 RX ORDER — CLOPIDOGREL BISULFATE 75 MG/1
300 TABLET ORAL ONCE
Status: DISCONTINUED | OUTPATIENT
Start: 2017-11-22 | End: 2017-11-21

## 2017-11-21 RX ORDER — ASPIRIN 81 MG/1
81 TABLET, CHEWABLE ORAL DAILY
Status: DISCONTINUED | OUTPATIENT
Start: 2017-11-21 | End: 2017-11-21 | Stop reason: HOSPADM

## 2017-11-21 RX ORDER — OMEPRAZOLE 20 MG/1
20 CAPSULE, DELAYED RELEASE ORAL DAILY
Status: DISCONTINUED | OUTPATIENT
Start: 2017-11-21 | End: 2017-11-21 | Stop reason: HOSPADM

## 2017-11-21 RX ADMIN — OMEPRAZOLE 20 MG: 20 CAPSULE, DELAYED RELEASE ORAL at 10:19

## 2017-11-21 RX ADMIN — ASPIRIN 81 MG: 81 TABLET, CHEWABLE ORAL at 10:19

## 2017-11-21 RX ADMIN — PRASUGREL HYDROCHLORIDE 10 MG: 10 TABLET, FILM COATED ORAL at 10:19

## 2017-11-21 RX ADMIN — LISINOPRIL 20 MG: 20 TABLET ORAL at 10:19

## 2017-11-21 RX ADMIN — LORAZEPAM 0.5 MG: 1 TABLET ORAL at 05:55

## 2017-11-21 ASSESSMENT — COGNITIVE AND FUNCTIONAL STATUS - GENERAL
MOBILITY SCORE: 24
SUGGESTED CMS G CODE MODIFIER MOBILITY: CH
DAILY ACTIVITIY SCORE: 24
SUGGESTED CMS G CODE MODIFIER DAILY ACTIVITY: CH

## 2017-11-21 ASSESSMENT — ENCOUNTER SYMPTOMS
SHORTNESS OF BREATH: 0
ORTHOPNEA: 0
DIZZINESS: 0
PALPITATIONS: 0
HEMOPTYSIS: 0
COUGH: 0
PND: 0
SPUTUM PRODUCTION: 0
CLAUDICATION: 0
WHEEZING: 0

## 2017-11-21 ASSESSMENT — PAIN SCALES - GENERAL
PAINLEVEL_OUTOF10: 0
PAINLEVEL_OUTOF10: 3
PAINLEVEL_OUTOF10: 0

## 2017-11-21 ASSESSMENT — PATIENT HEALTH QUESTIONNAIRE - PHQ9
SUM OF ALL RESPONSES TO PHQ9 QUESTIONS 1 AND 2: 0
1. LITTLE INTEREST OR PLEASURE IN DOING THINGS: NOT AT ALL
SUM OF ALL RESPONSES TO PHQ QUESTIONS 1-9: 0
2. FEELING DOWN, DEPRESSED, IRRITABLE, OR HOPELESS: NOT AT ALL

## 2017-11-21 ASSESSMENT — LIFESTYLE VARIABLES
EVER_SMOKED: NEVER
ALCOHOL_USE: NO

## 2017-11-21 NOTE — ED PROVIDER NOTES
ED Provider Note    Scribed for Amadou Montes M.D. by Rafael Ames. 11/21/2017, 2:04 AM.    Primary care provider: Mason Diallo M.D.  Means of arrival: Walk-In  History obtained from: Patient  History limited by: None    CHIEF COMPLAINT  Chief Complaint   Patient presents with   • Chest Pain     3 stents placed yesterday morning. Reports LT sided chest pain since this evening, states he is having difficulty breathing and an increased chest discomfort with inspirations.    • Difficulty Breathing   • Other     also c/o increased belching.     HPI  Gilberto Solorzano is a 67 y.o. male who presents to the Emergency Department complaining of shortness of breath onset a few hours earlier. The patient had 3 stents placed 1 day ago in the morning and went home feeling fine. However, when he tried to sleep he started to have those symptoms and associated mild chest pain and weakness. They tried contacting on-call cardiology, Dr. Samano, who advises the patient is propped up and could be due to inflammation after the procedure. Patient is unsure if it may be due to gasses and has a history of acid reflux. He took his Omeprazole for acid reflux earlier in the day. Denies abdominal pain, nausea, vomiting. Denies similar episodes. Denies any similar episodes of shortness of breath.    REVIEW OF SYSTEMS  Positive shortness of breath, chest pain, weakness. Patient denies fever, vision change, sore throat, nausea, dysuria, focal muscle pain, rashes.    C.    PAST MEDICAL HISTORY   has a past medical history of Breath shortness; Diabetes (CMS-HCC); Heart burn; and Hypertension.    SURGICAL HISTORY   has a past surgical history that includes other (2006).    SOCIAL HISTORY  Social History   Substance Use Topics   • Smoking status: Never Smoker   • Smokeless tobacco: Never Used   • Alcohol use No      History   Drug Use No     FAMILY HISTORY  None noted    CURRENT MEDICATIONS  Reviewed.  See Encounter Summary.  "    ALLERGIES  No Known Allergies    PHYSICAL EXAM  VITAL SIGNS: BP (!) 177/84   Pulse 88   Temp 36.8 °C (98.2 °F)   Resp 18   Ht 1.626 m (5' 4\")   Wt 76.4 kg (168 lb 6.9 oz)   SpO2 98%   BMI 28.91 kg/m²    Pulse ox interpretation: I interpret this pulse ox as normal.  Constitutional: Alert in no apparent distress.  HENT: Head normocephalic, atraumatic, Bilateral external ears normal, Nose normal.  Eyes: Pupils are equal, extraocular movements intact, Conjunctiva normal, Non-icteric.   Neck: Normal range of motion, Supple, No stridor.   Lymphatic: No lymphadenopathy noted.   Cardiovascular: Regular rate and rhythm. No rubs, murmurs or gallops   Thorax & Lungs: No acute respiratory distress, No wheezing, No increased work of breathing. Lungs clear to auscultation  Abdomen: Soft, Non tender, Non-distended, No pulsatile masses, No peritoneal signs.  Skin: Warm, Dry, No erythema, No rash.   Back: No bony tenderness, No CVA tenderness.   Extremities: Intact distal pulses, No edema, No tenderness, No cyanosis.    Musculoskeletal: Good range of motion in all major joints. No tenderness to palpation or major deformities noted.   Neurologic: Alert , Normal motor function, Normal sensory function, No focal deficits noted.   Psychiatric: Affect normal, Judgment normal, Mood normal.     DIAGNOSTIC STUDIES / PROCEDURES     LABS  Results for orders placed or performed during the hospital encounter of 11/21/17   COMP METABOLIC PANEL   Result Value Ref Range    Sodium 141 135 - 145 mmol/L    Potassium 3.7 3.6 - 5.5 mmol/L    Chloride 105 96 - 112 mmol/L    Co2 24 20 - 33 mmol/L    Anion Gap 12.0 (H) 0.0 - 11.9    Glucose 141 (H) 65 - 99 mg/dL    Bun 8 8 - 22 mg/dL    Creatinine 0.80 0.50 - 1.40 mg/dL    Calcium 9.8 8.5 - 10.5 mg/dL    AST(SGOT) 19 12 - 45 U/L    ALT(SGPT) 21 2 - 50 U/L    Alkaline Phosphatase 70 30 - 99 U/L    Total Bilirubin 0.9 0.1 - 1.5 mg/dL    Albumin 4.2 3.2 - 4.9 g/dL    Total Protein 7.3 6.0 - 8.2 " g/dL    Globulin 3.1 1.9 - 3.5 g/dL    A-G Ratio 1.4 g/dL   CBC WITH DIFFERENTIAL   Result Value Ref Range    WBC 9.6 4.8 - 10.8 K/uL    RBC 5.06 4.70 - 6.10 M/uL    Hemoglobin 12.7 (L) 14.0 - 18.0 g/dL    Hematocrit 39.8 (L) 42.0 - 52.0 %    MCV 78.7 (L) 81.4 - 97.8 fL    MCH 25.1 (L) 27.0 - 33.0 pg    MCHC 31.9 (L) 33.7 - 35.3 g/dL    RDW 49.5 35.9 - 50.0 fL    Platelet Count 184 164 - 446 K/uL    MPV 12.2 9.0 - 12.9 fL    Neutrophils-Polys 74.10 (H) 44.00 - 72.00 %    Lymphocytes 19.80 (L) 22.00 - 41.00 %    Monocytes 4.80 0.00 - 13.40 %    Eosinophils 0.40 0.00 - 6.90 %    Basophils 0.60 0.00 - 1.80 %    Immature Granulocytes 0.30 0.00 - 0.90 %    Nucleated RBC 0.00 /100 WBC    Neutrophils (Absolute) 7.08 1.82 - 7.42 K/uL    Lymphs (Absolute) 1.89 1.00 - 4.80 K/uL    Monos (Absolute) 0.46 0.00 - 0.85 K/uL    Eos (Absolute) 0.04 0.00 - 0.51 K/uL    Baso (Absolute) 0.06 0.00 - 0.12 K/uL    Immature Granulocytes (abs) 0.03 0.00 - 0.11 K/uL    NRBC (Absolute) 0.00 K/uL   TROPONIN   Result Value Ref Range    Troponin I 0.05 (H) 0.00 - 0.04 ng/mL   BNP   Result Value Ref Range    B Natriuretic Peptide 196 (H) 0 - 100 pg/mL   ESTIMATED GFR   Result Value Ref Range    GFR If African American >60 >60 mL/min/1.73 m 2    GFR If Non African American >60 >60 mL/min/1.73 m 2   EKG (ER)   Result Value Ref Range    Report       [facility] Emergency Dept.    Test Date:  2017  Pt Name:    CHARITO MOLINA                  Department: ER  MRN:        [id number]                      Room:  Gender:     M                            Technician: [id number]  :        [id number]                   Requested By:ER TRIAGE PROTOCOL  Order #:    [id number]                    Reading MD:    Measurements  Intervals                                Axis  Rate:       89                           P:          49  KY:         146                          QRS:        47  QRSD:       102                          T:          -12  QT:          368  QTc:        448    Interpretive Statements  SINUS RHYTHM  CONSIDER ANTEROSEPTAL INFARCT  BORDERLINE T ABNORMALITIES, INFERIOR LEADS  Compared to ECG 11/20/2017 08:38:17  No significant changes       All labs were reviewed by me.    EKG  12 Lead EKG interpreted by me to show:  Normal sinus rhythm  Rate 89  Intervals: Normal  Q waves in V1-V4 consistent with previous infarct  No ST-T wave changes suggestive of ischemia  Compared to previous EKG conducted 11/20/17, normalization of biphasic T waves seen on 11/20.   Impression: Evidence for previous infarct otherwise NSR.    RADIOLOGY  DX-CHEST-2 VIEWS   Final Result         1. No active cardiopulmonary abnormalities are identified.        The radiologist's interpretation of all radiological studies have been reviewed by me.    COURSE & MEDICAL DECISION MAKING  Pertinent Labs & Imaging studies reviewed. (See chart for details)    Reviewed old medical records on 11/20 that showed the catheter was placed by Dr. Hernández, Cardiology. Stenting at LAD and mid to distal left circumflex.    2:04 AM - Patient seen and examined at bedside. Ordered DX-Chest, BNP, CMP, CBC, Troponin to evaluate his symptoms.    3:06 AM - Paged Cardiology for Dr. Hernández.    3:11 AM - Consulted with Dr. Saamno, Cardiology, who is aware of the patient and agrees to admit him into his care.    3:16 AM - Patient seen at bedside. I discussed the lab and radiology results noted above which appear normal. I explained the consult noted above and stated that he will be admitted for observation to ensure his symptoms do not worsen. The patient understands and is agreeable.    Decision Making:  This is a 67 y.o. year old male who presents withShortness of breath after having a three-vessel stenting procedure earlier in the day. Patient has a history of coronary artery disease, and had coronary artery stents placed secondary to occlusion. The patient states that he was discharged and in his  usual state of health but then began having increased shortness of breath while at home particularly when attempting to be in the supine position. Patient contacted the on-call cardiologist, Dr. Samano, and the family felt that he should come in for further evaluation. Here, the patient has a fairly normal physical examination, troponin is only minimally elevated though I think this is likely secondary to the aftereffects of the procedure rather than a in-stent rethrombosis or some other acute ischemic event. The patient also does not have particularly elevated BNP, and does not have fluid overload on his chest x-ray. The patient also doesn't have signs of cardiomegaly, or jugular venous distention on exam that might suggest pericardial effusion however it is possible given his recent procedure that this is at least in part the cause of his symptoms. I spoke with the on-call cardiology, Dr. Samano, who felt that the patient might benefit from an echocardiogram this morning, and who came by and evaluated the patient, and it noted him to the cardiology service. Patient will have further workup performed by the cardiology service.    DISPOSITION:  Patient will be admitted to Dr. Samano, Hospitalist, in guarded condition.    FINAL IMPRESSION  1. Dyspnea  2. History of recent cardiac stents     Rafael WOLF (Scribe), am scribing for, and in the presence of, Amadou Montes M.D.    Electronically signed by: Rafael Ames (Guse), 11/21/2017    Amadou WOLF M.D. personally performed the services described in this documentation, as scribed by Rafael Ames in my presence, and it is both accurate and complete.    The note accurately reflects work and decisions made by me.  Amadou Montes  11/21/2017  5:26 AM

## 2017-11-21 NOTE — PROGRESS NOTES
2 RN skin check complete with SCOTT Almanzar. Pt has dressing over right wrist r/t recent heart cath procedure. Otherwise skin intact.

## 2017-11-21 NOTE — TELEPHONE ENCOUNTER
Pt dtr reports he has had some orthopnea after the procedure earlier today    Advised to monitor closely if orthopnea not improved would need to come in for ER eval and observation otherwise please call early AM and see how his breathing is and get him in with CR or NP today    It is my pleasure to participate in the care of Mr. Solorzano.  Please do not hesitate to contact me with questions or concerns.    Arturo Samano MD PhD FAC  Cardiologist [facility]

## 2017-11-21 NOTE — PROGRESS NOTES
Pt transferred from ED and brought up by wilfredo with no c/o chest pain however does belch often which is abnormal for him. Ambulated to bed with no assist. Telemetry monitor placed and verified with monitor room,  76. VSS.Pt oriented to unit policies and call system. Pt verbalized understanding. Bed locked in low position with fall precautions in place. Call light in reach.

## 2017-11-21 NOTE — PROGRESS NOTES
Pt discharged home. IV dc'd, tip intact. Discharge instructions discussed, he verbalized understanding and denied questions. Pt ambulated off unit with family. All bedside belongings sent with pt

## 2017-11-21 NOTE — PROGRESS NOTES
Cardiology Progress Note               Author: Giorgio Caputo Date & Time created: 2017  11:48 AM     Interval History:  67 years old male presented with shortness of breath and belching. He just had PCI yesterday in the setting of chest discomfort and abnormal stress test found to have two-vessel severe coronary disease underwent successful stenting to his LAD and left circumflex he recovered the postprocedure unit and was discharged home per protocol.    Consultation: SOB    17: denies any symptoms today   /93  HR 65    Labs reviewed       Review of Systems   Respiratory: Negative for cough, hemoptysis, sputum production, shortness of breath and wheezing.    Cardiovascular: Negative for chest pain, palpitations, orthopnea, claudication, leg swelling and PND.   Neurological: Negative for dizziness.       Physical Exam   Constitutional: He is oriented to person, place, and time. He appears well-developed and well-nourished.   HENT:   Head: Normocephalic.   Neck: Normal range of motion. No JVD present.   Cardiovascular: Normal rate, regular rhythm and normal heart sounds.    No murmur heard.  Pulses:       Radial pulses are 2+ on the right side, and 2+ on the left side.   Pulmonary/Chest: Effort normal and breath sounds normal. No respiratory distress. He has no wheezes.   Abdominal: Bowel sounds are normal.   Musculoskeletal: He exhibits no edema or tenderness.   Neurological: He is alert and oriented to person, place, and time.   Skin: Skin is warm and dry.   Psychiatric: His behavior is normal. Judgment normal.   Nursing note and vitals reviewed.      Hemodynamics:  Temp (24hrs), Av.6 °C (97.8 °F), Min:36.4 °C (97.5 °F), Max:36.8 °C (98.2 °F)  Temperature: 36.4 °C (97.6 °F)  Pulse  Av.8  Min: 65  Max: 88Heart Rate (Monitored): 77  Blood Pressure : (!) 162/93 (RN Notified), NIBP: 156/82     Respiratory:    Respiration: 16, Pulse Oximetry: 98 %           Fluids:     Weight: 76.2 kg (168  lb)  GI/Nutrition:  Orders Placed This Encounter   Procedures   • Diet NPO     Standing Status:   Standing     Number of Occurrences:   1     Order Specific Question:   Restrict to:     Answer:   Sips with Medications [3]     Lab Results:  Recent Labs      11/21/17 0215   WBC  9.6   RBC  5.06   HEMOGLOBIN  12.7*   HEMATOCRIT  39.8*   MCV  78.7*   MCH  25.1*   MCHC  31.9*   RDW  49.5   PLATELETCT  184   MPV  12.2     Recent Labs      11/21/17 0215   SODIUM  141   POTASSIUM  3.7   CHLORIDE  105   CO2  24   GLUCOSE  141*   BUN  8   CREATININE  0.80   CALCIUM  9.8         Recent Labs      11/21/17 0215   BNPBTYPENAT  196*     Recent Labs      11/21/17 0215   TROPONINI  0.05*   BNPBTYPENAT  196*             Medical Decision Making, by Problem:  Active Hospital Problems    Diagnosis   • Orthopnea [R06.01]       Plan:  1. Orthopnea and SOB:  - reperfusion phenomenon or sign for ACS  - limited ECHO pending   - switch effient to plavix with loading dose.   - start metoprolol 25mg BID due to CAD and hypertension, continue asa, statin, and ace.     Future Appointments  Date Time Provider Department Center   11/29/2017 8:40 AM T[facility] None         Quality-Core Measures

## 2017-11-21 NOTE — ED NOTES
FIRST CONTACT WITH Pt, PT IS AAOX4, PT IS IN NAD, PT WAS LINED AND LABED FOR PRIMARY CARE Rn. PT IS IN NAD. RN WAS GIVEN REPORT.

## 2017-11-21 NOTE — TELEPHONE ENCOUNTER
Unable to contact patient or daughter, LM x 2 on both patient's and daughters phone to call back regarding patient's orthopnea.

## 2017-11-21 NOTE — ED NOTES
"Chief Complaint   Patient presents with   • Chest Pain     3 stents placed yesterday morning. Reports LT sided chest pain since this evening, states he is having difficulty breathing and an increased chest discomfort with inspirations.    • Difficulty Breathing   • Other     also c/o increased belching.     Pt amb to triage with above following EKG.   Pt reports chest pain 3/10. Pt daughter called Dr. Samano and told them to come to ER.   Minimal distress noted in triage. BP noted, other VSS. Pt returned to [facility]. Educated on triage process and to inform staff of any changes.     BP (!) 177/84   Pulse 88   Temp 36.8 °C (98.2 °F)   Resp 18   Ht 1.626 m (5' 4\")   Wt 76.4 kg (168 lb 6.9 oz)   SpO2 98%   BMI 28.91 kg/m²     "

## 2017-11-21 NOTE — DISCHARGE INSTRUCTIONS
Discharge Instructions    Discharged to home by car with relative. Discharged via wheelchair, hospital escort: Refused.  Special equipment needed: Not Applicable    Be sure to schedule a follow-up appointment with your primary care doctor or any specialists as instructed.     Discharge Plan:   Influenza Vaccine Indication: Not indicated: Previously immunized this influenza season and > 8 years of age    I understand that a diet low in cholesterol, fat, and sodium is recommended for good health. Unless I have been given specific instructions below for another diet, I accept this instruction as my diet prescription.     Special Instructions: None    · Is patient discharged on Warfarin / Coumadin?   No     · Is patient Post Blood Transfusion?  No      Shortness of Breath  Shortness of breath means you have trouble breathing. It could also mean that you have a medical problem. You should get immediate medical care for shortness of breath.  CAUSES   · Not enough oxygen in the air such as with high altitudes or a smoke-filled room.  · Certain lung diseases, infections, or problems.  · Heart disease or conditions, such as angina or heart failure.  · Low red blood cells (anemia).  · Poor physical fitness, which can cause shortness of breath when you exercise.  · Chest or back injuries or stiffness.  · Being overweight.  · Smoking.  · Anxiety, which can make you feel like you are not getting enough air.  DIAGNOSIS   Serious medical problems can often be found during your physical exam. Tests may also be done to determine why you are having shortness of breath. Tests may include:  · Chest X-rays.  · Lung function tests.  · Blood tests.  · An electrocardiogram (ECG).  · An ambulatory electrocardiogram. An ambulatory ECG records your heartbeat patterns over a 24-hour period.  · Exercise testing.  · A transthoracic echocardiogram (TTE). During echocardiography, sound waves are used to evaluate how blood flows through your  heart.  · A transesophageal echocardiogram (LETICIA).  · Imaging scans.  Your health care provider may not be able to find a cause for your shortness of breath after your exam. In this case, it is important to have a follow-up exam with your health care provider as directed.   TREATMENT   Treatment for shortness of breath depends on the cause of your symptoms and can vary greatly.  HOME CARE INSTRUCTIONS   · Do not smoke. Smoking is a common cause of shortness of breath. If you smoke, ask for help to quit.  · Avoid being around chemicals or things that may bother your breathing, such as paint fumes and dust.  · Rest as needed. Slowly resume your usual activities.  · If medicines were prescribed, take them as directed for the full length of time directed. This includes oxygen and any inhaled medicines.  · Keep all follow-up appointments as directed by your health care provider.  SEEK MEDICAL CARE IF:   · Your condition does not improve in the time expected.  · You have a hard time doing your normal activities even with rest.  · You have any new symptoms.  SEEK IMMEDIATE MEDICAL CARE IF:   · Your shortness of breath gets worse.  · You feel light-headed, faint, or develop a cough not controlled with medicines.  · You start coughing up blood.  · You have pain with breathing.  · You have chest pain or pain in your arms, shoulders, or abdomen.  · You have a fever.  · You are unable to walk up stairs or exercise the way you normally do.  MAKE SURE YOU:  · Understand these instructions.  · Will watch your condition.  · Will get help right away if you are not doing well or get worse.     This information is not intended to replace advice given to you by your health care provider. Make sure you discuss any questions you have with your health care provider.     Document Released: 09/12/2002 Document Revised: 12/23/2014 Document Reviewed: 03/04/2013  [employer] Patient Education ©2016 [employer]          Depression /  Suicide Risk    As you are discharged from this [facility] facility, it is important to learn how to keep safe from harming yourself.    Recognize the warning signs:  · Abrupt changes in personality, positive or negative- including increase in energy   · Giving away possessions  · Change in eating patterns- significant weight changes-  positive or negative  · Change in sleeping patterns- unable to sleep or sleeping all the time   · Unwillingness or inability to communicate  · Depression  · Unusual sadness, discouragement and loneliness  · Talk of wanting to die  · Neglect of personal appearance   · Rebelliousness- reckless behavior  · Withdrawal from people/activities they love  · Confusion- inability to concentrate     If you or a loved one observes any of these behaviors or has concerns about self-harm, here's what you can do:  · Talk about it- your feelings and reasons for harming yourself  · Remove any means that you might use to hurt yourself (examples: pills, rope, extension cords, firearm)  · Get professional help from the community (Mental Health, Substance Abuse, psychological counseling)  · Do not be alone:Call your Safe Contact- someone whom you trust who will be there for you.  · Call your local CRISIS HOTLINE 727-0716 or 240-450-4130  · Call your local Children's Mobile Crisis Response Team [place] (870) 433-1139 or www.Tresorit  · Call the toll free National Suicide Prevention Hotlines   · National Suicide Prevention Lifeline 462-091-QVVW (4026)  · National Hope Line Network 800-SUICIDE (285-1405)

## 2017-11-21 NOTE — H&P
Reason for Consult:  Asked by Dr Montes to see this patient with shortness of breath belching after recent elective PCI  Patient's PCP: Mason Diallo M.D.    CC: Shortness of breath belching    HPI: 67-year-old gentleman who just had PCI yesterday in the setting of chest discomfort and abnormal stress test he was found to have two-vessel severe coronary disease underwent successful stenting to his LAD and left circumflex he recovered the postprocedure unit and was discharged home per protocol. He noted over the afternoon and evening hours frequent belching took some food spices but no other medications over-the-counter he had received Effient in the cath lab but no other change in medications over the evening hours he had noted more orthopnea and shortness of breath but this seemed to improve with standing and walking around his daughter had called me and we discussed continued monitoring but if his symptoms are not improved to present for emergency evaluation.    In the ER he has mild hypertension but otherwise vitals are within acceptable range oxygen normal range he has frequent belching on exam otherwise testing is within acceptable range    Medications / Drug list prior to admission:  No current facility-administered medications on file prior to encounter.      Current Outpatient Prescriptions on File Prior to Encounter   Medication Sig Dispense Refill   • prasugrel (EFFIENT) 10 MG Tab Take 1 Tab by mouth every day. 90 Tab 3   • lisinopril (PRINIVIL) 20 MG Tab Take 20 mg by mouth 2 times a day.     • metformin ER (GLUCOPHAGE XR) 750 MG TABLET SR 24 HR Take 750 mg by mouth every day.     • omeprazole (PRILOSEC) 20 MG delayed-release capsule Take 20 mg by mouth every day.     • Multiple Vitamins-Minerals (CENTRUM SILVER PO) Take  by mouth.     • Omega-3 Fatty Acids (FISH OIL ULTRA) 1400 MG Cap Take  by mouth.     • Cholecalciferol (VITAMIN D3) 2000 UNIT Cap Take  by mouth.     • aspirin 81 MG tablet Take 81 mg  by mouth every day.     • atorvastatin (LIPITOR) 40 MG Tab Take 1 Tab by mouth every evening. 90 Tab 3       Current list of administered Medications:  No current facility-administered medications for this encounter.     Current Outpatient Prescriptions:   •  prasugrel (EFFIENT) 10 MG Tab, Take 1 Tab by mouth every day., Disp: 90 Tab, Rfl: 3  •  lisinopril (PRINIVIL) 20 MG Tab, Take 20 mg by mouth 2 times a day., Disp: , Rfl:   •  metformin ER (GLUCOPHAGE XR) 750 MG TABLET SR 24 HR, Take 750 mg by mouth every day., Disp: , Rfl:   •  omeprazole (PRILOSEC) 20 MG delayed-release capsule, Take 20 mg by mouth every day., Disp: , Rfl:   •  Multiple Vitamins-Minerals (CENTRUM SILVER PO), Take  by mouth., Disp: , Rfl:   •  Omega-3 Fatty Acids (FISH OIL ULTRA) 1400 MG Cap, Take  by mouth., Disp: , Rfl:   •  Cholecalciferol (VITAMIN D3) 2000 UNIT Cap, Take  by mouth., Disp: , Rfl:   •  aspirin 81 MG tablet, Take 81 mg by mouth every day., Disp: , Rfl:   •  atorvastatin (LIPITOR) 40 MG Tab, Take 1 Tab by mouth every evening., Disp: 90 Tab, Rfl: 3    Past Medical History:   Diagnosis Date   • Breath shortness    • Diabetes (CMS-HCC)     oral medications   • Heart burn    • Hypertension        Past Surgical History:   Procedure Laterality Date   • OTHER  2006    cervical fusion       No family history on file. Noncontributory    Social History     Social History   • Marital status:      Spouse name: N/A   • Number of children: N/A   • Years of education: N/A     Occupational History   • Not on file.     Social History Main Topics   • Smoking status: Never Smoker   • Smokeless tobacco: Never Used   • Alcohol use No   • Drug use: No   • Sexual activity: Not on file     Other Topics Concern   • Not on file     Social History Narrative   • No narrative on file       No Known Allergies    Review of systems:  A detailed review of symptoms was reviewed with patient. This is reviewed in H&P and PMH. ALL OTHERS reviewed and  "negative    Physical exam:  Patient Vitals for the past 24 hrs:   BP Temp Pulse Resp SpO2 Height Weight   11/21/17 0333 - - - - - 1.626 m (5' 4\") 76.2 kg (168 lb)   11/21/17 0323 - - 68 (!) 5 96 % - -   11/21/17 0139 - - - - - 1.626 m (5' 4\") 76.4 kg (168 lb 6.9 oz)   11/21/17 0131 (!) 177/84 36.8 °C (98.2 °F) 88 18 98 % - -       General: No acute distress. Belching throughout the exam  HEENT: OP clear   Neck: No bruits No JVD.   CVS: RRR. S1 + S2. No M/R/G  Resp: CTAB. No wheezing or crackles/rhonchi.  Abdomen: Soft, NT, ND,  Skin: No rashes, erythema or wounds.   Neurological: grossly within normal range   Extremities: Pulse 2+ in b/l LE. No edema. No cyanosis. Right radial site clean dry and intact with appropriate bandage    Data:  Laboratory studies:  Lab Results   Component Value Date/Time    WBC 9.6 11/21/2017 02:15 AM    RBC 5.06 11/21/2017 02:15 AM    HEMOGLOBIN 12.7 (L) 11/21/2017 02:15 AM    HEMATOCRIT 39.8 (L) 11/21/2017 02:15 AM    MCV 78.7 (L) 11/21/2017 02:15 AM    MCH 25.1 (L) 11/21/2017 02:15 AM    MCHC 31.9 (L) 11/21/2017 02:15 AM    MPV 12.2 11/21/2017 02:15 AM    NEUTSPOLYS 74.10 (H) 11/21/2017 02:15 AM    LYMPHOCYTES 19.80 (L) 11/21/2017 02:15 AM    MONOCYTES 4.80 11/21/2017 02:15 AM    EOSINOPHILS 0.40 11/21/2017 02:15 AM    BASOPHILS 0.60 11/21/2017 02:15 AM    HYPOCHROMIA 1+ 03/24/2014 09:12 AM        Lab Results   Component Value Date/Time    SODIUM 141 11/21/2017 02:15 AM    POTASSIUM 3.7 11/21/2017 02:15 AM    CHLORIDE 105 11/21/2017 02:15 AM    CO2 24 11/21/2017 02:15 AM    GLUCOSE 141 (H) 11/21/2017 02:15 AM    BUN 8 11/21/2017 02:15 AM    CREATININE 0.80 11/21/2017 02:15 AM        Recent Labs      11/21/17   0215   ASTSGOT  19   ALTSGPT  21   TBILIRUBIN  0.9   ALKPHOSPHAT  70   GLOBULIN  3.1       Lab Results   Component Value Date/Time    PROTHROMBTM 15.3 (H) 11/17/2017 09:04 AM    INR 1.24 (H) 11/17/2017 09:04 AM        Imaging:  [id number] [id number]   Final Result         1. No active " cardiopulmonary abnormalities are identified.          EKG :Sinus rhythm without acute ischemic changes    Labs minimally elevated troponin 0.05 BNP minimally elevated    All pertinent features of laboratory and imaging reviewed including primary images where applicable    Assessment / Plan:  Orthopnea and shortness of breath belching  May be a reperfusion phenomenon or sign for ACS continue monitor will get a limited echocardiogram to exclude pericardial complications but no synovitis on exam. Will keep nothing by mouth just in case but can have breakfast if this is within acceptable range  Trial Ativan for sleep tonight as he's been up    It is my pleasure to participate in the care of Mr. Solorzano.  Please do not hesitate to contact me with questions or concerns.    Arturo Samano MD PhD [facility]  Cardiologist [facility]    11/21/2017

## 2017-11-29 ENCOUNTER — OFFICE VISIT (OUTPATIENT)
Dept: CARDIOLOGY | Facility: MEDICAL CENTER | Age: 67
End: 2017-11-29
Payer: MEDICARE

## 2017-11-29 VITALS
OXYGEN SATURATION: 98 % | SYSTOLIC BLOOD PRESSURE: 158 MMHG | HEART RATE: 74 BPM | BODY MASS INDEX: 29.23 KG/M2 | HEIGHT: 64 IN | WEIGHT: 171.2 LBS | DIASTOLIC BLOOD PRESSURE: 72 MMHG

## 2017-11-29 DIAGNOSIS — I10 ESSENTIAL HYPERTENSION: ICD-10-CM

## 2017-11-29 DIAGNOSIS — E78.2 MIXED HYPERLIPIDEMIA: ICD-10-CM

## 2017-11-29 DIAGNOSIS — I25.118 CORONARY ARTERY DISEASE OF NATIVE ARTERY OF NATIVE HEART WITH STABLE ANGINA PECTORIS (HCC): ICD-10-CM

## 2017-11-29 DIAGNOSIS — E11.9 TYPE 2 DIABETES MELLITUS WITHOUT COMPLICATION, WITHOUT LONG-TERM CURRENT USE OF INSULIN (HCC): ICD-10-CM

## 2017-11-29 DIAGNOSIS — Z95.5 S/P DRUG ELUTING CORONARY STENT PLACEMENT: Primary | ICD-10-CM

## 2017-11-29 PROCEDURE — 99214 OFFICE O/P EST MOD 30 MIN: CPT | Performed by: NURSE PRACTITIONER

## 2017-11-29 RX ORDER — LISINOPRIL 20 MG/1
20 TABLET ORAL 2 TIMES DAILY
Qty: 180 TAB | Refills: 3 | Status: SHIPPED | OUTPATIENT
Start: 2017-11-29 | End: 2018-11-27 | Stop reason: SDUPTHER

## 2017-11-29 ASSESSMENT — ENCOUNTER SYMPTOMS
DIZZINESS: 0
ABDOMINAL PAIN: 0
CLAUDICATION: 0
MYALGIAS: 0
WEAKNESS: 0
COUGH: 0
SHORTNESS OF BREATH: 1
ORTHOPNEA: 0
PALPITATIONS: 0
PND: 0

## 2017-11-29 NOTE — PROGRESS NOTES
Subjective:   Gilberto Solorzano is a 67 y.o. male who presents today With his wife, Amairani, for hospital follow-up. He's been having exertional chest discomfort and underwent a stress test which was abnormal.    He was taken to the Cath Lab on November 20, 2017 and received 3 stents area and he received a stent to his LAD, stents to circumflex distal and mid. He was discharged home.    The next day he presented to the emergency room complaining of shortness of breath. He was evaluated with echocardiogram which showed a normal ejection fraction. The cause of his shortness of breath was not determined.     Since being out of the hospital, he's had no exertional chest discomfort. He does breathe hard when he exerts himself but has no orthopnea or PND. No ankle edema.    He and his wife are planning on going to [facility] for several weeks leaving in late December.    Past Medical History:   Diagnosis Date   • Breath shortness    • Diabetes (CMS-HCC)     oral medications   • Heart burn    • Hypertension      Past Surgical History:   Procedure Laterality Date   • CARDIAC CATH  11/20/2017    Stent LAD, 2 stents to Circ.   • OTHER  2006    cervical fusion     History reviewed. No pertinent family history.  History   Smoking Status   • Never Smoker   Smokeless Tobacco   • Never Used     No Known Allergies  Outpatient Encounter Prescriptions as of 11/29/2017   Medication Sig Dispense Refill   • lisinopril (PRINIVIL) 20 MG Tab Take 1 Tab by mouth 2 times a day. 180 Tab 3   • metoprolol (LOPRESSOR) 25 MG Tab Take 0.5 Tabs by mouth 2 Times a Day. 90 Tab 3   • clopidogrel (PLAVIX) 75 MG Tab Take 1 Tab by mouth every day. 90 Tab 5   • metformin ER (GLUCOPHAGE XR) 750 MG TABLET SR 24 HR Take 1,000 mg by mouth every day.     • omeprazole (PRILOSEC) 20 MG delayed-release capsule Take 20 mg by mouth every day.     • Multiple Vitamins-Minerals (CENTRUM SILVER PO) Take  by mouth.     • Omega-3 Fatty Acids (FISH OIL ULTRA) 1400 MG Cap  "Take  by mouth.     • Cholecalciferol (VITAMIN D3) 2000 UNIT Cap Take  by mouth.     • aspirin 81 MG tablet Take 81 mg by mouth every day.     • atorvastatin (LIPITOR) 40 MG Tab Take 1 Tab by mouth every evening. 90 Tab 3   • [DISCONTINUED] lisinopril (PRINIVIL) 20 MG Tab Take 20 mg by mouth 2 times a day.       No facility-administered encounter medications on file as of 11/29/2017.      Review of Systems   Constitutional: Negative for malaise/fatigue.   Respiratory: Positive for shortness of breath (exertion.). Negative for cough.    Cardiovascular: Negative for chest pain, palpitations, orthopnea, claudication, leg swelling and PND.   Gastrointestinal: Negative for abdominal pain.   Musculoskeletal: Negative for myalgias.   Neurological: Negative for dizziness and weakness.        Objective:   /72   Pulse 74   Ht 1.626 m (5' 4\")   Wt 77.7 kg (171 lb 3.2 oz)   SpO2 98%   BMI 29.39 kg/m²     Physical Exam   Constitutional: He is oriented to person, place, and time. He appears well-developed and well-nourished.   HENT:   Head: Normocephalic.   Eyes: Conjunctivae are normal.   Neck: No JVD present. No thyromegaly present.   Cardiovascular: Normal rate, regular rhythm and normal heart sounds.    Pulmonary/Chest: Effort normal and breath sounds normal. He has no wheezes. He has no rales.   Abdominal: Soft. Bowel sounds are normal. He exhibits no distension. There is no tenderness.   Musculoskeletal: He exhibits no edema.   Neurological: He is alert and oriented to person, place, and time.   Skin: Skin is warm and dry.   Psychiatric: He has a normal mood and affect.     Results for CHARITO MOLINA (MRN [id number]) as of 11/29/2017 08:59   Ref. Range 11/21/2017 02:15   WBC Latest Ref Range: 4.8 - 10.8 K/uL 9.6   RBC Latest Ref Range: 4.70 - 6.10 M/uL 5.06   Hemoglobin Latest Ref Range: 14.0 - 18.0 g/dL 12.7 (L)   Hematocrit Latest Ref Range: 42.0 - 52.0 % 39.8 (L)   MCV Latest Ref Range: 81.4 - 97.8 fL " 78.7 (L)   MCH Latest Ref Range: 27.0 - 33.0 pg 25.1 (L)   MCHC Latest Ref Range: 33.7 - 35.3 g/dL 31.9 (L)   RDW Latest Ref Range: 35.9 - 50.0 fL 49.5   Platelet Count Latest Ref Range: 164 - 446 K/uL 184   MPV Latest Ref Range: 9.0 - 12.9 fL 12.2   Neutrophils-Polys Latest Ref Range: 44.00 - 72.00 % 74.10 (H)   Neutrophils (Absolute) Latest Ref Range: 1.82 - 7.42 K/uL 7.08   Lymphocytes Latest Ref Range: 22.00 - 41.00 % 19.80 (L)   Lymphs (Absolute) Latest Ref Range: 1.00 - 4.80 K/uL 1.89   Monocytes Latest Ref Range: 0.00 - 13.40 % 4.80   Monos (Absolute) Latest Ref Range: 0.00 - 0.85 K/uL 0.46   Eosinophils Latest Ref Range: 0.00 - 6.90 % 0.40   Eos (Absolute) Latest Ref Range: 0.00 - 0.51 K/uL 0.04   Basophils Latest Ref Range: 0.00 - 1.80 % 0.60   Baso (Absolute) Latest Ref Range: 0.00 - 0.12 K/uL 0.06   Immature Granulocytes Latest Ref Range: 0.00 - 0.90 % 0.30   Immature Granulocytes (abs) Latest Ref Range: 0.00 - 0.11 K/uL 0.03   Nucleated RBC Latest Units: /100 WBC 0.00   NRBC (Absolute) Latest Units: K/uL 0.00   Sodium Latest Ref Range: 135 - 145 mmol/L 141   Potassium Latest Ref Range: 3.6 - 5.5 mmol/L 3.7   Chloride Latest Ref Range: 96 - 112 mmol/L 105   Co2 Latest Ref Range: 20 - 33 mmol/L 24   Anion Gap Latest Ref Range: 0.0 - 11.9  12.0 (H)   Glucose Latest Ref Range: 65 - 99 mg/dL 141 (H)   Bun Latest Ref Range: 8 - 22 mg/dL 8   Creatinine Latest Ref Range: 0.50 - 1.40 mg/dL 0.80   GFR If  Latest Ref Range: >60 mL/min/1.73 m 2 >60   GFR If Non  Latest Ref Range: >60 mL/min/1.73 m 2 >60   Calcium Latest Ref Range: 8.5 - 10.5 mg/dL 9.8   AST(SGOT) Latest Ref Range: 12 - 45 U/L 19   ALT(SGPT) Latest Ref Range: 2 - 50 U/L 21   Alkaline Phosphatase Latest Ref Range: 30 - 99 U/L 70   Total Bilirubin Latest Ref Range: 0.1 - 1.5 mg/dL 0.9   Albumin Latest Ref Range: 3.2 - 4.9 g/dL 4.2   Total Protein Latest Ref Range: 6.0 - 8.2 g/dL 7.3   Globulin Latest Ref Range: 1.9 - 3.5  g/dL 3.1   A-G Ratio Latest Units: g/dL 1.4   Troponin I Latest Ref Range: 0.00 - 0.04 ng/mL 0.05 (H)   B Natriuretic Peptide Latest Ref Range: 0 - 100 pg/mL 196 (H)     November 21, 2017: Transthoracic Echo Report  2 D echo only  Normal left ventricular chamber size.  Normal left ventricular systolic function.  Left ventricular ejection fraction is visually estimated to be 65%.  Difficult to coment on certain wallmotionbut distal septum and apex   appeared mild hypokinetic.  Normal pericardium without effusion.    Assessment:     1. S/P drug eluting coronary stent placement     2. Coronary artery disease of native artery of native heart with stable angina pectoris (CMS-HCC)     3. Essential hypertension     4. Mixed hyperlipidemia  COMP METABOLIC PANEL    LIPID PROFILE   5. Type 2 diabetes mellitus without complication, without long-term current use of insulin (CMS-HCC)  HEMOGLOBIN A1C (Glycohemoglobin GHB Total/A1C with MBG Estimate)       Medical Decision Making:  Today's Assessment / Status / Plan:     Coronary artery disease: Status post 3 drug-eluting stents. Patient is had no exertional anginal symptoms. I've encouraged him to exercise on a regular basis. He's had no inappropriate dyspnea.    Hypertension: His blood pressure is higher than I would like. His systolic blood pressure goal is 130. I would like him to monitor his blood pressure morning and evening and send us a message in 2 weeks. If his blood pressures consistently greater than 130 I will increase metoprolol to 25 mg twice a day.    Hyperlipidemia: He is on a statin. We'll check lipids and metabolic panel in 3 months.    Diabetes: Managed by primary care. Since he is having lab done as a courtesy I will order hemoglobin A1c.    He will follow-up in 3 months with Dr. Arturo He with lab work prior. He will follow-up sooner if problems.    Collaborating Provider: Dr Wick.

## 2017-11-29 NOTE — LETTER
[facility]   [address]  [place] [address]  Phone: [phone]  Fax: [phone]              Gilberto Solorzano  1950    Encounter Date: 11/29/2017    TAYO Anne          PROGRESS NOTE:  Subjective:   Gilberto Solorzano is a 67 y.o. male who presents today With his wife, Amairani, for hospital follow-up. He's been having exertional chest discomfort and underwent a stress test which was abnormal.    He was taken to the Cath Lab on November 20, 2017 and received 3 stents area and he received a stent to his LAD, stents to circumflex distal and mid. He was discharged home.    The next day he presented to the emergency room complaining of shortness of breath. He was evaluated with echocardiogram which showed a normal ejection fraction. The cause of his shortness of breath was not determined.     Since being out of the hospital, he's had no exertional chest discomfort. He does breathe hard when he exerts himself but has no orthopnea or PND. No ankle edema.    He and his wife are planning on going to [facility] for several weeks leaving in late December.    Past Medical History:   Diagnosis Date   • Breath shortness    • Diabetes (CMS-HCC)     oral medications   • Heart burn    • Hypertension      Past Surgical History:   Procedure Laterality Date   • CARDIAC CATH  11/20/2017    Stent LAD, 2 stents to Circ.   • OTHER  2006    cervical fusion     History reviewed. No pertinent family history.  History   Smoking Status   • Never Smoker   Smokeless Tobacco   • Never Used     No Known Allergies  Outpatient Encounter Prescriptions as of 11/29/2017   Medication Sig Dispense Refill   • lisinopril (PRINIVIL) 20 MG Tab Take 1 Tab by mouth 2 times a day. 180 Tab 3   • metoprolol (LOPRESSOR) 25 MG Tab Take 0.5 Tabs by mouth 2 Times a Day. 90 Tab 3   • clopidogrel (PLAVIX) 75 MG Tab Take 1 Tab by mouth every day. 90 Tab 5   • metformin ER (GLUCOPHAGE  "XR) 750 MG TABLET SR 24 HR Take 1,000 mg by mouth every day.     • omeprazole (PRILOSEC) 20 MG delayed-release capsule Take 20 mg by mouth every day.     • Multiple Vitamins-Minerals (CENTRUM SILVER PO) Take  by mouth.     • Omega-3 Fatty Acids (FISH OIL ULTRA) 1400 MG Cap Take  by mouth.     • Cholecalciferol (VITAMIN D3) 2000 UNIT Cap Take  by mouth.     • aspirin 81 MG tablet Take 81 mg by mouth every day.     • atorvastatin (LIPITOR) 40 MG Tab Take 1 Tab by mouth every evening. 90 Tab 3   • [DISCONTINUED] lisinopril (PRINIVIL) 20 MG Tab Take 20 mg by mouth 2 times a day.       No facility-administered encounter medications on file as of 11/29/2017.      Review of Systems   Constitutional: Negative for malaise/fatigue.   Respiratory: Positive for shortness of breath (exertion.). Negative for cough.    Cardiovascular: Negative for chest pain, palpitations, orthopnea, claudication, leg swelling and PND.   Gastrointestinal: Negative for abdominal pain.   Musculoskeletal: Negative for myalgias.   Neurological: Negative for dizziness and weakness.        Objective:   /72   Pulse 74   Ht 1.626 m (5' 4\")   Wt 77.7 kg (171 lb 3.2 oz)   SpO2 98%   BMI 29.39 kg/m²      Physical Exam   Constitutional: He is oriented to person, place, and time. He appears well-developed and well-nourished.   HENT:   Head: Normocephalic.   Eyes: Conjunctivae are normal.   Neck: No JVD present. No thyromegaly present.   Cardiovascular: Normal rate, regular rhythm and normal heart sounds.    Pulmonary/Chest: Effort normal and breath sounds normal. He has no wheezes. He has no rales.   Abdominal: Soft. Bowel sounds are normal. He exhibits no distension. There is no tenderness.   Musculoskeletal: He exhibits no edema.   Neurological: He is alert and oriented to person, place, and time.   Skin: Skin is warm and dry.   Psychiatric: He has a normal mood and affect.     Results for CHARITO MOLINA REGLA (MRN [id number]) as of 11/29/2017 " 08:59   Ref. Range 11/21/2017 02:15   WBC Latest Ref Range: 4.8 - 10.8 K/uL 9.6   RBC Latest Ref Range: 4.70 - 6.10 M/uL 5.06   Hemoglobin Latest Ref Range: 14.0 - 18.0 g/dL 12.7 (L)   Hematocrit Latest Ref Range: 42.0 - 52.0 % 39.8 (L)   MCV Latest Ref Range: 81.4 - 97.8 fL 78.7 (L)   MCH Latest Ref Range: 27.0 - 33.0 pg 25.1 (L)   MCHC Latest Ref Range: 33.7 - 35.3 g/dL 31.9 (L)   RDW Latest Ref Range: 35.9 - 50.0 fL 49.5   Platelet Count Latest Ref Range: 164 - 446 K/uL 184   MPV Latest Ref Range: 9.0 - 12.9 fL 12.2   Neutrophils-Polys Latest Ref Range: 44.00 - 72.00 % 74.10 (H)   Neutrophils (Absolute) Latest Ref Range: 1.82 - 7.42 K/uL 7.08   Lymphocytes Latest Ref Range: 22.00 - 41.00 % 19.80 (L)   Lymphs (Absolute) Latest Ref Range: 1.00 - 4.80 K/uL 1.89   Monocytes Latest Ref Range: 0.00 - 13.40 % 4.80   Monos (Absolute) Latest Ref Range: 0.00 - 0.85 K/uL 0.46   Eosinophils Latest Ref Range: 0.00 - 6.90 % 0.40   Eos (Absolute) Latest Ref Range: 0.00 - 0.51 K/uL 0.04   Basophils Latest Ref Range: 0.00 - 1.80 % 0.60   Baso (Absolute) Latest Ref Range: 0.00 - 0.12 K/uL 0.06   Immature Granulocytes Latest Ref Range: 0.00 - 0.90 % 0.30   Immature Granulocytes (abs) Latest Ref Range: 0.00 - 0.11 K/uL 0.03   Nucleated RBC Latest Units: /100 WBC 0.00   NRBC (Absolute) Latest Units: K/uL 0.00   Sodium Latest Ref Range: 135 - 145 mmol/L 141   Potassium Latest Ref Range: 3.6 - 5.5 mmol/L 3.7   Chloride Latest Ref Range: 96 - 112 mmol/L 105   Co2 Latest Ref Range: 20 - 33 mmol/L 24   Anion Gap Latest Ref Range: 0.0 - 11.9  12.0 (H)   Glucose Latest Ref Range: 65 - 99 mg/dL 141 (H)   Bun Latest Ref Range: 8 - 22 mg/dL 8   Creatinine Latest Ref Range: 0.50 - 1.40 mg/dL 0.80   GFR If  Latest Ref Range: >60 mL/min/1.73 m 2 >60   GFR If Non  Latest Ref Range: >60 mL/min/1.73 m 2 >60   Calcium Latest Ref Range: 8.5 - 10.5 mg/dL 9.8   AST(SGOT) Latest Ref Range: 12 - 45 U/L 19   ALT(SGPT) Latest  Ref Range: 2 - 50 U/L 21   Alkaline Phosphatase Latest Ref Range: 30 - 99 U/L 70   Total Bilirubin Latest Ref Range: 0.1 - 1.5 mg/dL 0.9   Albumin Latest Ref Range: 3.2 - 4.9 g/dL 4.2   Total Protein Latest Ref Range: 6.0 - 8.2 g/dL 7.3   Globulin Latest Ref Range: 1.9 - 3.5 g/dL 3.1   A-G Ratio Latest Units: g/dL 1.4   Troponin I Latest Ref Range: 0.00 - 0.04 ng/mL 0.05 (H)   B Natriuretic Peptide Latest Ref Range: 0 - 100 pg/mL 196 (H)     November 21, 2017: Transthoracic Echo Report  2 D echo only  Normal left ventricular chamber size.  Normal left ventricular systolic function.  Left ventricular ejection fraction is visually estimated to be 65%.  Difficult to coment on certain wallmotionbut distal septum and apex   appeared mild hypokinetic.  Normal pericardium without effusion.    Assessment:     1. S/P drug eluting coronary stent placement     2. Coronary artery disease of native artery of native heart with stable angina pectoris (CMS-HCC)     3. Essential hypertension     4. Mixed hyperlipidemia  COMP METABOLIC PANEL    LIPID PROFILE   5. Type 2 diabetes mellitus without complication, without long-term current use of insulin (CMS-HCC)  HEMOGLOBIN A1C (Glycohemoglobin GHB Total/A1C with MBG Estimate)       Medical Decision Making:  Today's Assessment / Status / Plan:     Coronary artery disease: Status post 3 drug-eluting stents. Patient is had no exertional anginal symptoms. I've encouraged him to exercise on a regular basis. He's had no inappropriate dyspnea.    Hypertension: His blood pressure is higher than I would like. His systolic blood pressure goal is 130. I would like him to monitor his blood pressure morning and evening and send us a message in 2 weeks. If his blood pressures consistently greater than 130 I will increase metoprolol to 25 mg twice a day.    Hyperlipidemia: He is on a statin. We'll check lipids and metabolic panel in 3 months.    Diabetes: Managed by primary care. Since he is having  lab done as a courtesy I will order hemoglobin A1c.    He will follow-up in 3 months with Dr. Arturo He with lab work prior. He will follow-up sooner if problems.    Collaborating Provider: Dr Wick.        No Recipients

## 2017-12-01 ENCOUNTER — PATIENT OUTREACH (OUTPATIENT)
Dept: HEALTH INFORMATION MANAGEMENT | Facility: OTHER | Age: 67
End: 2017-12-01

## 2017-12-08 ENCOUNTER — PATIENT OUTREACH (OUTPATIENT)
Dept: HEALTH INFORMATION MANAGEMENT | Facility: OTHER | Age: 67
End: 2017-12-08

## 2017-12-08 NOTE — LETTER
December 8, 2017        Gilberto Solorzano  [address] [place] [id number]        Dear Gilberto:    I am sorry we missed you with our calls.  I wanted to let you know [facility] are available to you at no cost.  I have enclosed a brochure and left you a message on your voicemail.  I realize you may be on vacation according to your doctor. Please call at your convenience.     Care Coordination is here to help you with health and wellness.  Services we offer are    · Scheduling and coordination of doctor visits  · Pharmacy review for costs and side effects  · Disease specific Education  · Social Determinants Review (Food, housing, community programs)  · Health and Wellness Programs connection  · Remote Health Monitoring for Some Health Impairments (COPD, Heart Failure, Diabetes, Hypertension)  • Longitudinal Plan of Care to assist you in success to be your healthiest you across the healthcare continuum    We look forward to hearing from you if we can help or you have questions.      If you have any questions or concerns, please don't hesitate to call.        Sincerely,        Elidia Heck, R.N.  [phone]    Electronically Signed

## 2017-12-08 NOTE — PROGRESS NOTES
Outreach call to pt. Unable to reach, message left requesting call back and provided contact number. Per MD notes and pt and spouse may be traveling outside of the country soon. Will mail brochure and card to pt.  Plan: Will attempt to reach at a later date/time.

## 2018-01-12 ENCOUNTER — PATIENT OUTREACH (OUTPATIENT)
Dept: HEALTH INFORMATION MANAGEMENT | Facility: OTHER | Age: 68
End: 2018-01-12

## 2018-04-23 ENCOUNTER — HOSPITAL ENCOUNTER (OUTPATIENT)
Dept: LAB | Facility: MEDICAL CENTER | Age: 68
End: 2018-04-23
Attending: NURSE PRACTITIONER
Payer: MEDICARE

## 2018-04-23 DIAGNOSIS — E11.9 TYPE 2 DIABETES MELLITUS WITHOUT COMPLICATION, WITHOUT LONG-TERM CURRENT USE OF INSULIN (HCC): ICD-10-CM

## 2018-04-23 DIAGNOSIS — E78.2 MIXED HYPERLIPIDEMIA: ICD-10-CM

## 2018-04-23 LAB
ALBUMIN SERPL BCP-MCNC: 4.1 G/DL (ref 3.2–4.9)
ALBUMIN/GLOB SERPL: 1.3 G/DL
ALP SERPL-CCNC: 62 U/L (ref 30–99)
ALT SERPL-CCNC: 20 U/L (ref 2–50)
ANION GAP SERPL CALC-SCNC: 7 MMOL/L (ref 0–11.9)
AST SERPL-CCNC: 23 U/L (ref 12–45)
BILIRUB SERPL-MCNC: 0.6 MG/DL (ref 0.1–1.5)
BUN SERPL-MCNC: 17 MG/DL (ref 8–22)
CALCIUM SERPL-MCNC: 9.2 MG/DL (ref 8.5–10.5)
CHLORIDE SERPL-SCNC: 106 MMOL/L (ref 96–112)
CHOLEST SERPL-MCNC: 91 MG/DL (ref 100–199)
CO2 SERPL-SCNC: 26 MMOL/L (ref 20–33)
CREAT SERPL-MCNC: 0.85 MG/DL (ref 0.5–1.4)
EST. AVERAGE GLUCOSE BLD GHB EST-MCNC: 140 MG/DL
GLOBULIN SER CALC-MCNC: 3.1 G/DL (ref 1.9–3.5)
GLUCOSE SERPL-MCNC: 102 MG/DL (ref 65–99)
HBA1C MFR BLD: 6.5 % (ref 0–5.6)
HDLC SERPL-MCNC: 38 MG/DL
LDLC SERPL CALC-MCNC: 39 MG/DL
POTASSIUM SERPL-SCNC: 4.1 MMOL/L (ref 3.6–5.5)
PROT SERPL-MCNC: 7.2 G/DL (ref 6–8.2)
SODIUM SERPL-SCNC: 139 MMOL/L (ref 135–145)
TRIGL SERPL-MCNC: 69 MG/DL (ref 0–149)

## 2018-04-23 PROCEDURE — 80053 COMPREHEN METABOLIC PANEL: CPT

## 2018-04-23 PROCEDURE — 80061 LIPID PANEL: CPT

## 2018-04-23 PROCEDURE — 36415 COLL VENOUS BLD VENIPUNCTURE: CPT

## 2018-04-23 PROCEDURE — 83036 HEMOGLOBIN GLYCOSYLATED A1C: CPT

## 2018-04-26 ENCOUNTER — OFFICE VISIT (OUTPATIENT)
Dept: CARDIOLOGY | Facility: MEDICAL CENTER | Age: 68
End: 2018-04-26
Payer: MEDICARE

## 2018-04-26 VITALS
DIASTOLIC BLOOD PRESSURE: 100 MMHG | HEIGHT: 64 IN | SYSTOLIC BLOOD PRESSURE: 182 MMHG | HEART RATE: 66 BPM | BODY MASS INDEX: 27.14 KG/M2 | OXYGEN SATURATION: 98 % | WEIGHT: 159 LBS

## 2018-04-26 DIAGNOSIS — E78.2 MIXED HYPERLIPIDEMIA: ICD-10-CM

## 2018-04-26 DIAGNOSIS — Z95.5 S/P DRUG ELUTING CORONARY STENT PLACEMENT: ICD-10-CM

## 2018-04-26 DIAGNOSIS — I25.118 CORONARY ARTERY DISEASE OF NATIVE ARTERY OF NATIVE HEART WITH STABLE ANGINA PECTORIS (HCC): ICD-10-CM

## 2018-04-26 DIAGNOSIS — R06.02 SOB (SHORTNESS OF BREATH): ICD-10-CM

## 2018-04-26 DIAGNOSIS — E11.9 TYPE 2 DIABETES MELLITUS WITHOUT COMPLICATION, WITHOUT LONG-TERM CURRENT USE OF INSULIN (HCC): ICD-10-CM

## 2018-04-26 DIAGNOSIS — R00.2 PALPITATIONS: ICD-10-CM

## 2018-04-26 DIAGNOSIS — I20.0 UNSTABLE ANGINA PECTORIS (HCC): ICD-10-CM

## 2018-04-26 DIAGNOSIS — I10 ESSENTIAL HYPERTENSION: ICD-10-CM

## 2018-04-26 PROCEDURE — 99214 OFFICE O/P EST MOD 30 MIN: CPT | Performed by: INTERNAL MEDICINE

## 2018-04-26 RX ORDER — AMLODIPINE BESYLATE 5 MG/1
5 TABLET ORAL DAILY
Qty: 90 TAB | Refills: 3 | Status: SHIPPED | OUTPATIENT
Start: 2018-04-26 | End: 2018-11-27 | Stop reason: SDUPTHER

## 2018-04-26 ASSESSMENT — ENCOUNTER SYMPTOMS
PND: 0
WHEEZING: 0
COUGH: 0
GASTROINTESTINAL NEGATIVE: 1
RESPIRATORY NEGATIVE: 1
CLAUDICATION: 0
LOSS OF CONSCIOUSNESS: 0
CHILLS: 0
NEUROLOGICAL NEGATIVE: 1
DIZZINESS: 0
CONSTITUTIONAL NEGATIVE: 1
FEVER: 0
SPUTUM PRODUCTION: 0
SORE THROAT: 0
CARDIOVASCULAR NEGATIVE: 1
STRIDOR: 0
ORTHOPNEA: 0
SHORTNESS OF BREATH: 0
BRUISES/BLEEDS EASILY: 0
HEMOPTYSIS: 0
MUSCULOSKELETAL NEGATIVE: 1
EYES NEGATIVE: 1
WEAKNESS: 0
PALPITATIONS: 0

## 2018-04-26 NOTE — PROGRESS NOTES
Chief Complaint   Patient presents with   • Shortness of Breath   • Palpitations       Subjective:   Gilberto Solorzano is a 67 y.o. male who presents today as a follow-up for his chest pain and abnormal nuclear stress test and stents to his LAD and circumflex. Since he was last seen he continues to do well. He is having no chest pain shortness of breath or orthopnea. He does check his blood pressure occasionally notices his average systolic is between 135 and 145. He says every time he goes to the doctor's elevated. He is asking which was medications he can stop today. Last lipids looked good and were at goal. His most recent labs were also good. His A1c is controlled at 6.5.    Past Medical History:   Diagnosis Date   • Breath shortness    • Diabetes (CMS-HCC)     oral medications   • Heart burn    • Hypertension      Past Surgical History:   Procedure Laterality Date   • CARDIAC CATH  11/20/2017    Stent LAD, 2 stents to Circ.   • OTHER  2006    cervical fusion     History reviewed. No pertinent family history.  Social History     Social History   • Marital status:      Spouse name: N/A   • Number of children: N/A   • Years of education: N/A     Occupational History   • Not on file.     Social History Main Topics   • Smoking status: Never Smoker   • Smokeless tobacco: Never Used   • Alcohol use No   • Drug use: No   • Sexual activity: Not on file     Other Topics Concern   • Not on file     Social History Narrative   • No narrative on file     No Known Allergies  Outpatient Encounter Prescriptions as of 4/26/2018   Medication Sig Dispense Refill   • amLODIPine (NORVASC) 5 MG Tab Take 1 Tab by mouth every day. 90 Tab 3   • metoprolol (LOPRESSOR) 25 MG Tab Take 1 Tab by mouth 2 Times a Day. 180 Tab 3   • lisinopril (PRINIVIL) 20 MG Tab Take 1 Tab by mouth 2 times a day. 180 Tab 3   • clopidogrel (PLAVIX) 75 MG Tab Take 1 Tab by mouth every day. 90 Tab 5   • metformin ER (GLUCOPHAGE XR) 750 MG TABLET SR  "24 HR Take 1,000 mg by mouth every day.     • omeprazole (PRILOSEC) 20 MG delayed-release capsule Take 20 mg by mouth every day.     • Multiple Vitamins-Minerals (CENTRUM SILVER PO) Take  by mouth.     • Omega-3 Fatty Acids (FISH OIL ULTRA) 1400 MG Cap Take  by mouth.     • Cholecalciferol (VITAMIN D3) 2000 UNIT Cap Take  by mouth.     • aspirin 81 MG tablet Take 81 mg by mouth every day.     • atorvastatin (LIPITOR) 40 MG Tab Take 1 Tab by mouth every evening. 90 Tab 3     No facility-administered encounter medications on file as of 4/26/2018.      Review of Systems   Constitutional: Negative.  Negative for chills, fever and malaise/fatigue.   HENT: Negative.  Negative for sore throat.    Eyes: Negative.    Respiratory: Negative.  Negative for cough, hemoptysis, sputum production, shortness of breath, wheezing and stridor.    Cardiovascular: Negative.  Negative for chest pain, palpitations, orthopnea, claudication, leg swelling and PND.   Gastrointestinal: Negative.    Genitourinary: Negative.    Musculoskeletal: Negative.    Skin: Negative.    Neurological: Negative.  Negative for dizziness, loss of consciousness and weakness.   Endo/Heme/Allergies: Negative.  Does not bruise/bleed easily.   All other systems reviewed and are negative.       Objective:   BP (!) 182/100   Pulse 66   Ht 1.626 m (5' 4\")   Wt 72.1 kg (159 lb)   SpO2 98%   BMI 27.29 kg/m²     Physical Exam   Constitutional: He appears well-developed and well-nourished. No distress.   HENT:   Head: Normocephalic and atraumatic.   Right Ear: External ear normal.   Left Ear: External ear normal.   Nose: Nose normal.   Mouth/Throat: No oropharyngeal exudate.   Eyes: Conjunctivae and EOM are normal. Pupils are equal, round, and reactive to light. Right eye exhibits no discharge. Left eye exhibits no discharge. No scleral icterus.   Neck: Neck supple. No JVD present.   Cardiovascular: Normal rate, regular rhythm and intact distal pulses.  Exam reveals " no gallop and no friction rub.    No murmur heard.  Pulmonary/Chest: Effort normal. No stridor. No respiratory distress. He has no wheezes. He has no rales. He exhibits no tenderness.   Abdominal: Soft. He exhibits no distension. There is no guarding.   Musculoskeletal: Normal range of motion. He exhibits no edema, tenderness or deformity.   Neurological: He is alert. He has normal reflexes. He displays normal reflexes. No cranial nerve deficit. He exhibits normal muscle tone. Coordination normal.   Skin: Skin is warm and dry. No rash noted. He is not diaphoretic. No erythema. No pallor.   Psychiatric: He has a normal mood and affect. His behavior is normal. Judgment and thought content normal.   Nursing note and vitals reviewed.    Lab Results   Component Value Date/Time    CHOLSTRLTOT 91 (L) 04/23/2018 07:03 AM    LDL 39 04/23/2018 07:03 AM    HDL 38 (A) 04/23/2018 07:03 AM    TRIGLYCERIDE 69 04/23/2018 07:03 AM       Lab Results   Component Value Date/Time    SODIUM 139 04/23/2018 07:03 AM    POTASSIUM 4.1 04/23/2018 07:03 AM    CHLORIDE 106 04/23/2018 07:03 AM    CO2 26 04/23/2018 07:03 AM    GLUCOSE 102 (H) 04/23/2018 07:03 AM    BUN 17 04/23/2018 07:03 AM    CREATININE 0.85 04/23/2018 07:03 AM     Lab Results   Component Value Date/Time    ALKPHOSPHAT 62 04/23/2018 07:03 AM    ASTSGOT 23 04/23/2018 07:03 AM    ALTSGPT 20 04/23/2018 07:03 AM    TBILIRUBIN 0.6 04/23/2018 07:03 AM      Echo: 11/21/2017  Normal left ventricular chamber size.  Normal left ventricular systolic function.  Left ventricular ejection fraction is visually estimated to be 65%.  Difficult to coment on certain wallmotionbut distal septum and apex   appeared mild hypokinetic.  Normal pericardium without effusion.    Assessment:     1. Type 2 diabetes mellitus without complication, without long-term current use of insulin (CMS-[facility])     2. SOB (shortness of breath)  amLODIPine (NORVASC) 5 MG Tab   3. S/P drug eluting coronary stent placement   amLODIPine (NORVASC) 5 MG Tab   4. Mixed hyperlipidemia     5. Essential hypertension  amLODIPine (NORVASC) 5 MG Tab   6. Coronary artery disease of native artery of native heart with stable angina pectoris (CMS-HCC)  amLODIPine (NORVASC) 5 MG Tab   7. Unstable angina pectoris (CMS-HCC)     8. Palpitations         Medical Decision Making:  Today's Assessment / Status / Plan:     67-year-old male with hypertension hyperlipidemia diabetes and CAD status post stents. We will add on 5 mg a day and amlodipine for his blood pressure. Otherwise his lipids are at goal. He will stay on DAPT therapy for one year. We will see him back at the one-year anniversary of the stent to stop his clopidogrel.    1. CAD s/p stent, CARLO to LAD, [facility] to mLCx, dLCX 11/20/2017    - asa    - clopidogrel 75 daily     - atorva 40    2. HTN    - cont metop 25 BID    - cont lisin 20 BID    - start amlodipine 5 mg      3. HLD    - at goal    4. T2DM    - A1C 6.5    Thank for you allowing me to take part in your patient's care, please call should you have any questions or would like to discuss this patient.

## 2018-04-26 NOTE — LETTER
[facility]   [address]  [place] [address]  Phone: [phone]  Fax: [phone]              Gilberto Solorzano  1950    Encounter Date: 4/26/2018    Arturo Estrada M.D.          PROGRESS NOTE:  Chief Complaint   Patient presents with   • Shortness of Breath   • Palpitations       Subjective:   Gilberto Solorzano is a 67 y.o. male who presents today as a follow-up for his chest pain and abnormal nuclear stress test and stents to his LAD and circumflex. Since he was last seen he continues to do well. He is having no chest pain shortness of breath or orthopnea. He does check his blood pressure occasionally notices his average systolic is between 135 and 145. He says every time he goes to the doctor's elevated. He is asking which was medications he can stop today. Last lipids looked good and were at goal. His most recent labs were also good. His A1c is controlled at 6.5.    Past Medical History:   Diagnosis Date   • Breath shortness    • Diabetes (CMS-HCC)     oral medications   • Heart burn    • Hypertension      Past Surgical History:   Procedure Laterality Date   • CARDIAC CATH  11/20/2017    Stent LAD, 2 stents to Circ.   • OTHER  2006    cervical fusion     History reviewed. No pertinent family history.  Social History     Social History   • Marital status:      Spouse name: N/A   • Number of children: N/A   • Years of education: N/A     Occupational History   • Not on file.     Social History Main Topics   • Smoking status: Never Smoker   • Smokeless tobacco: Never Used   • Alcohol use No   • Drug use: No   • Sexual activity: Not on file     Other Topics Concern   • Not on file     Social History Narrative   • No narrative on file     No Known Allergies  Outpatient Encounter Prescriptions as of 4/26/2018   Medication Sig Dispense Refill   • amLODIPine (NORVASC) 5 MG Tab Take 1 Tab by mouth every day. 90 Tab 3   • metoprolol  "(LOPRESSOR) 25 MG Tab Take 1 Tab by mouth 2 Times a Day. 180 Tab 3   • lisinopril (PRINIVIL) 20 MG Tab Take 1 Tab by mouth 2 times a day. 180 Tab 3   • clopidogrel (PLAVIX) 75 MG Tab Take 1 Tab by mouth every day. 90 Tab 5   • metformin ER (GLUCOPHAGE XR) 750 MG TABLET SR 24 HR Take 1,000 mg by mouth every day.     • omeprazole (PRILOSEC) 20 MG delayed-release capsule Take 20 mg by mouth every day.     • Multiple Vitamins-Minerals (CENTRUM SILVER PO) Take  by mouth.     • Omega-3 Fatty Acids (FISH OIL ULTRA) 1400 MG Cap Take  by mouth.     • Cholecalciferol (VITAMIN D3) 2000 UNIT Cap Take  by mouth.     • aspirin 81 MG tablet Take 81 mg by mouth every day.     • atorvastatin (LIPITOR) 40 MG Tab Take 1 Tab by mouth every evening. 90 Tab 3     No facility-administered encounter medications on file as of 4/26/2018.      Review of Systems   Constitutional: Negative.  Negative for chills, fever and malaise/fatigue.   HENT: Negative.  Negative for sore throat.    Eyes: Negative.    Respiratory: Negative.  Negative for cough, hemoptysis, sputum production, shortness of breath, wheezing and stridor.    Cardiovascular: Negative.  Negative for chest pain, palpitations, orthopnea, claudication, leg swelling and PND.   Gastrointestinal: Negative.    Genitourinary: Negative.    Musculoskeletal: Negative.    Skin: Negative.    Neurological: Negative.  Negative for dizziness, loss of consciousness and weakness.   Endo/Heme/Allergies: Negative.  Does not bruise/bleed easily.   All other systems reviewed and are negative.       Objective:   BP (!) 182/100   Pulse 66   Ht 1.626 m (5' 4\")   Wt 72.1 kg (159 lb)   SpO2 98%   BMI 27.29 kg/m²      Physical Exam   Constitutional: He appears well-developed and well-nourished. No distress.   HENT:   Head: Normocephalic and atraumatic.   Right Ear: External ear normal.   Left Ear: External ear normal.   Nose: Nose normal.   Mouth/Throat: No oropharyngeal exudate.   Eyes: Conjunctivae and " EOM are normal. Pupils are equal, round, and reactive to light. Right eye exhibits no discharge. Left eye exhibits no discharge. No scleral icterus.   Neck: Neck supple. No JVD present.   Cardiovascular: Normal rate, regular rhythm and intact distal pulses.  Exam reveals no gallop and no friction rub.    No murmur heard.  Pulmonary/Chest: Effort normal. No stridor. No respiratory distress. He has no wheezes. He has no rales. He exhibits no tenderness.   Abdominal: Soft. He exhibits no distension. There is no guarding.   Musculoskeletal: Normal range of motion. He exhibits no edema, tenderness or deformity.   Neurological: He is alert. He has normal reflexes. He displays normal reflexes. No cranial nerve deficit. He exhibits normal muscle tone. Coordination normal.   Skin: Skin is warm and dry. No rash noted. He is not diaphoretic. No erythema. No pallor.   Psychiatric: He has a normal mood and affect. His behavior is normal. Judgment and thought content normal.   Nursing note and vitals reviewed.    Lab Results   Component Value Date/Time    CHOLSTRLTOT 91 (L) 04/23/2018 07:03 AM    LDL 39 04/23/2018 07:03 AM    HDL 38 (A) 04/23/2018 07:03 AM    TRIGLYCERIDE 69 04/23/2018 07:03 AM       Lab Results   Component Value Date/Time    SODIUM 139 04/23/2018 07:03 AM    POTASSIUM 4.1 04/23/2018 07:03 AM    CHLORIDE 106 04/23/2018 07:03 AM    CO2 26 04/23/2018 07:03 AM    GLUCOSE 102 (H) 04/23/2018 07:03 AM    BUN 17 04/23/2018 07:03 AM    CREATININE 0.85 04/23/2018 07:03 AM     Lab Results   Component Value Date/Time    ALKPHOSPHAT 62 04/23/2018 07:03 AM    ASTSGOT 23 04/23/2018 07:03 AM    ALTSGPT 20 04/23/2018 07:03 AM    TBILIRUBIN 0.6 04/23/2018 07:03 AM      Echo: 11/21/2017  Normal left ventricular chamber size.  Normal left ventricular systolic function.  Left ventricular ejection fraction is visually estimated to be 65%.  Difficult to coment on certain wallmotionbut distal septum and apex   appeared mild  hypokinetic.  Normal pericardium without effusion.    Assessment:     1. Type 2 diabetes mellitus without complication, without long-term current use of insulin (CMS-[facility])     2. SOB (shortness of breath)  amLODIPine (NORVASC) 5 MG Tab   3. S/P drug eluting coronary stent placement  amLODIPine (NORVASC) 5 MG Tab   4. Mixed hyperlipidemia     5. Essential hypertension  amLODIPine (NORVASC) 5 MG Tab   6. Coronary artery disease of native artery of native heart with stable angina pectoris (CMS-[facility])  amLODIPine (NORVASC) 5 MG Tab   7. Unstable angina pectoris (CMS-[facility])     8. Palpitations         Medical Decision Making:  Today's Assessment / Status / Plan:     67-year-old male with hypertension hyperlipidemia diabetes and CAD status post stents. We will add on 5 mg a day and amlodipine for his blood pressure. Otherwise his lipids are at goal. He will stay on DAPT therapy for one year. We will see him back at the one-year anniversary of the stent to stop his clopidogrel.    1. CAD s/p stent, CARLO to LAD, [facility] to mLCx, dLCX 11/20/2017    - asa    - clopidogrel 75 daily     - atorva 40    2. HTN    - cont metop 25 BID    - cont lisin 20 BID    - start amlodipine 5 mg      3. HLD    - at goal    4. T2DM    - A1C 6.5    Thank for you allowing me to take part in your patient's care, please call should you have any questions or would like to discuss this patient.      Mason Diallo M.D.  [address]  [address]  [place] [id number]  VIA Facsimile: [phone]

## 2018-05-11 ENCOUNTER — HOSPITAL ENCOUNTER (OUTPATIENT)
Dept: LAB | Facility: MEDICAL CENTER | Age: 68
End: 2018-05-11
Attending: FAMILY MEDICINE
Payer: MEDICARE

## 2018-05-11 LAB
ALBUMIN SERPL BCP-MCNC: 4 G/DL (ref 3.2–4.9)
ALBUMIN/GLOB SERPL: 1.4 G/DL
ALP SERPL-CCNC: 71 U/L (ref 30–99)
ALT SERPL-CCNC: 18 U/L (ref 2–50)
ANION GAP SERPL CALC-SCNC: 8 MMOL/L (ref 0–11.9)
APPEARANCE UR: CLEAR
AST SERPL-CCNC: 20 U/L (ref 12–45)
BACTERIA #/AREA URNS HPF: NEGATIVE /HPF
BILIRUB SERPL-MCNC: 0.7 MG/DL (ref 0.1–1.5)
BILIRUB UR QL STRIP.AUTO: NEGATIVE
BUN SERPL-MCNC: 14 MG/DL (ref 8–22)
CALCIUM SERPL-MCNC: 9.4 MG/DL (ref 8.5–10.5)
CHLORIDE SERPL-SCNC: 105 MMOL/L (ref 96–112)
CO2 SERPL-SCNC: 26 MMOL/L (ref 20–33)
COLOR UR: YELLOW
CREAT SERPL-MCNC: 0.78 MG/DL (ref 0.5–1.4)
EPI CELLS #/AREA URNS HPF: NEGATIVE /HPF
GLOBULIN SER CALC-MCNC: 2.8 G/DL (ref 1.9–3.5)
GLUCOSE SERPL-MCNC: 121 MG/DL (ref 65–99)
GLUCOSE UR STRIP.AUTO-MCNC: NEGATIVE MG/DL
HYALINE CASTS #/AREA URNS LPF: ABNORMAL /LPF
KETONES UR STRIP.AUTO-MCNC: NEGATIVE MG/DL
LEUKOCYTE ESTERASE UR QL STRIP.AUTO: ABNORMAL
MICRO URNS: ABNORMAL
NITRITE UR QL STRIP.AUTO: NEGATIVE
PH UR STRIP.AUTO: 6 [PH]
POTASSIUM SERPL-SCNC: 3.9 MMOL/L (ref 3.6–5.5)
PROT SERPL-MCNC: 6.8 G/DL (ref 6–8.2)
PROT UR QL STRIP: NEGATIVE MG/DL
PSA SERPL-MCNC: 0.95 NG/ML (ref 0–4)
RBC # URNS HPF: ABNORMAL /HPF
RBC UR QL AUTO: NEGATIVE
SODIUM SERPL-SCNC: 139 MMOL/L (ref 135–145)
SP GR UR STRIP.AUTO: 1.01
TSH SERPL DL<=0.005 MIU/L-ACNC: 1.34 UIU/ML (ref 0.38–5.33)
UROBILINOGEN UR STRIP.AUTO-MCNC: 0.2 MG/DL
WBC #/AREA URNS HPF: ABNORMAL /HPF

## 2018-05-11 PROCEDURE — 36415 COLL VENOUS BLD VENIPUNCTURE: CPT

## 2018-05-11 PROCEDURE — 80053 COMPREHEN METABOLIC PANEL: CPT

## 2018-05-11 PROCEDURE — 84443 ASSAY THYROID STIM HORMONE: CPT

## 2018-05-11 PROCEDURE — 84153 ASSAY OF PSA TOTAL: CPT

## 2018-05-11 PROCEDURE — 81001 URINALYSIS AUTO W/SCOPE: CPT

## 2018-08-23 ENCOUNTER — HOSPITAL ENCOUNTER (OUTPATIENT)
Dept: LAB | Facility: MEDICAL CENTER | Age: 68
End: 2018-08-23
Attending: NURSE PRACTITIONER
Payer: MEDICARE

## 2018-08-23 PROCEDURE — 82274 ASSAY TEST FOR BLOOD FECAL: CPT

## 2018-08-27 LAB — HEMOCCULT STL QL IA: POSITIVE

## 2018-09-22 DIAGNOSIS — E78.2 MIXED HYPERLIPIDEMIA: ICD-10-CM

## 2018-09-22 DIAGNOSIS — E11.9 TYPE 2 DIABETES MELLITUS WITHOUT COMPLICATION, WITHOUT LONG-TERM CURRENT USE OF INSULIN (HCC): ICD-10-CM

## 2018-09-22 DIAGNOSIS — R06.02 SOB (SHORTNESS OF BREATH): ICD-10-CM

## 2018-09-22 DIAGNOSIS — R00.2 PALPITATIONS: ICD-10-CM

## 2018-09-26 RX ORDER — ATORVASTATIN CALCIUM 40 MG/1
TABLET, FILM COATED ORAL
Qty: 90 TAB | Refills: 2 | Status: SHIPPED | OUTPATIENT
Start: 2018-09-26 | End: 2018-11-27 | Stop reason: SDUPTHER

## 2018-11-27 ENCOUNTER — OFFICE VISIT (OUTPATIENT)
Dept: CARDIOLOGY | Facility: MEDICAL CENTER | Age: 68
End: 2018-11-27
Payer: MEDICARE

## 2018-11-27 VITALS
HEART RATE: 86 BPM | BODY MASS INDEX: 29.16 KG/M2 | SYSTOLIC BLOOD PRESSURE: 168 MMHG | OXYGEN SATURATION: 95 % | DIASTOLIC BLOOD PRESSURE: 88 MMHG | WEIGHT: 170.8 LBS | HEIGHT: 64 IN

## 2018-11-27 DIAGNOSIS — Z79.899 HIGH RISK MEDICATION USE: ICD-10-CM

## 2018-11-27 DIAGNOSIS — I25.118 CORONARY ARTERY DISEASE OF NATIVE ARTERY OF NATIVE HEART WITH STABLE ANGINA PECTORIS (HCC): ICD-10-CM

## 2018-11-27 DIAGNOSIS — Z95.5 S/P DRUG ELUTING CORONARY STENT PLACEMENT: ICD-10-CM

## 2018-11-27 DIAGNOSIS — R06.02 SOB (SHORTNESS OF BREATH): ICD-10-CM

## 2018-11-27 DIAGNOSIS — I10 ESSENTIAL HYPERTENSION: ICD-10-CM

## 2018-11-27 DIAGNOSIS — I20.0 UNSTABLE ANGINA PECTORIS (HCC): ICD-10-CM

## 2018-11-27 DIAGNOSIS — E11.9 TYPE 2 DIABETES MELLITUS WITHOUT COMPLICATION, WITHOUT LONG-TERM CURRENT USE OF INSULIN (HCC): ICD-10-CM

## 2018-11-27 DIAGNOSIS — R06.01 ORTHOPNEA: ICD-10-CM

## 2018-11-27 DIAGNOSIS — E78.2 MIXED HYPERLIPIDEMIA: ICD-10-CM

## 2018-11-27 DIAGNOSIS — R00.2 PALPITATIONS: ICD-10-CM

## 2018-11-27 PROCEDURE — 99214 OFFICE O/P EST MOD 30 MIN: CPT | Performed by: INTERNAL MEDICINE

## 2018-11-27 RX ORDER — ATORVASTATIN CALCIUM 40 MG/1
80 TABLET, FILM COATED ORAL EVERY EVENING
Qty: 180 TAB | Refills: 3 | Status: SHIPPED | OUTPATIENT
Start: 2018-11-27 | End: 2019-04-02 | Stop reason: SDUPTHER

## 2018-11-27 RX ORDER — LISINOPRIL 20 MG/1
20 TABLET ORAL 2 TIMES DAILY
Qty: 180 TAB | Refills: 3 | Status: SHIPPED | OUTPATIENT
Start: 2018-11-27 | End: 2019-03-11 | Stop reason: SDUPTHER

## 2018-11-27 RX ORDER — AMLODIPINE BESYLATE 5 MG/1
5 TABLET ORAL DAILY
Qty: 90 TAB | Refills: 3 | Status: SHIPPED | OUTPATIENT
Start: 2018-11-27 | End: 2019-11-20 | Stop reason: SDUPTHER

## 2018-11-27 ASSESSMENT — ENCOUNTER SYMPTOMS
BRUISES/BLEEDS EASILY: 0
NEUROLOGICAL NEGATIVE: 1
CLAUDICATION: 0
SHORTNESS OF BREATH: 0
HEMOPTYSIS: 0
WHEEZING: 0
LOSS OF CONSCIOUSNESS: 0
EYES NEGATIVE: 1
CHILLS: 0
CARDIOVASCULAR NEGATIVE: 1
MUSCULOSKELETAL NEGATIVE: 1
PALPITATIONS: 0
DIZZINESS: 0
ORTHOPNEA: 0
WEAKNESS: 0
SORE THROAT: 0
FEVER: 0
STRIDOR: 0
GASTROINTESTINAL NEGATIVE: 1
PND: 0
RESPIRATORY NEGATIVE: 1
COUGH: 0
CONSTITUTIONAL NEGATIVE: 1
SPUTUM PRODUCTION: 0

## 2018-11-27 NOTE — LETTER
[facility] and [facility]   [address]  [place] [address]  Phone: [phone]  Fax: [phone]              Gilberto Solorzano  1950    Encounter Date: 11/27/2018    Arturo Estrada M.D.          PROGRESS NOTE:  Chief Complaint   Patient presents with   • Palpitations     follow up       Subjective:   Gilberto Solorzano is a 68 y.o. male who presents today As a follow-up for his CAD status post stents.  Is been 1 year since he had his stents placed.  He is completed 1 year of dual antiplatelet therapy.  He has not been have any chest pain palpitations or PND.  His blood pressure was elevated at check-in today but during his visit rechecked at 130/80.  He has no shortness of breath or lower extremity edema.  His lipids are at goal.  He does take fish oil and is asking if he can stop that because it causes him a significant fish burp.    Past Medical History:   Diagnosis Date   • Breath shortness    • Diabetes (HCC)     oral medications   • Heart burn    • Hypertension      Past Surgical History:   Procedure Laterality Date   • CARDIAC CATH  11/20/2017    Stent LAD, 2 stents to Circ.   • OTHER  2006    cervical fusion     No family history on file.  Social History     Social History   • Marital status:      Spouse name: N/A   • Number of children: N/A   • Years of education: N/A     Occupational History   • Not on file.     Social History Main Topics   • Smoking status: Never Smoker   • Smokeless tobacco: Never Used   • Alcohol use No   • Drug use: No   • Sexual activity: Not on file     Other Topics Concern   • Not on file     Social History Narrative   • No narrative on file     No Known Allergies  Outpatient Encounter Prescriptions as of 11/27/2018   Medication Sig Dispense Refill   • amLODIPine (NORVASC) 5 MG Tab Take 1 Tab by mouth every day. 90 Tab 3   • atorvastatin (LIPITOR) 40 MG Tab Take 2 Tabs by mouth every evening. 180 Tab 3   •  metoprolol (LOPRESSOR) 25 MG Tab Take 1 Tab by mouth 2 Times a Day. 180 Tab 3   • lisinopril (PRINIVIL) 20 MG Tab Take 1 Tab by mouth 2 times a day. 180 Tab 3   • metformin ER (GLUCOPHAGE XR) 750 MG TABLET SR 24 HR Take 1,000 mg by mouth every day.     • omeprazole (PRILOSEC) 20 MG delayed-release capsule Take 20 mg by mouth every day.     • Multiple Vitamins-Minerals (CENTRUM SILVER PO) Take  by mouth.     • Cholecalciferol (VITAMIN D3) 2000 UNIT Cap Take  by mouth.     • aspirin 81 MG tablet Take 81 mg by mouth every day.     • [DISCONTINUED] atorvastatin (LIPITOR) 40 MG Tab TAKE ONE TABLET BY MOUTH ONCE DAILY IN THE EVENING 90 Tab 2   • [DISCONTINUED] amLODIPine (NORVASC) 5 MG Tab Take 1 Tab by mouth every day. 90 Tab 3   • [DISCONTINUED] metoprolol (LOPRESSOR) 25 MG Tab Take 1 Tab by mouth 2 Times a Day. 180 Tab 3   • [DISCONTINUED] lisinopril (PRINIVIL) 20 MG Tab Take 1 Tab by mouth 2 times a day. 180 Tab 3   • [DISCONTINUED] clopidogrel (PLAVIX) 75 MG Tab Take 1 Tab by mouth every day. 90 Tab 5   • [DISCONTINUED] Omega-3 Fatty Acids (FISH OIL ULTRA) 1400 MG Cap Take  by mouth.       No facility-administered encounter medications on file as of 11/27/2018.      Review of Systems   Constitutional: Negative.  Negative for chills, fever and malaise/fatigue.   HENT: Negative.  Negative for sore throat.    Eyes: Negative.    Respiratory: Negative.  Negative for cough, hemoptysis, sputum production, shortness of breath, wheezing and stridor.    Cardiovascular: Negative.  Negative for chest pain, palpitations, orthopnea, claudication, leg swelling and PND.   Gastrointestinal: Negative.    Genitourinary: Negative.    Musculoskeletal: Negative.    Skin: Negative.    Neurological: Negative.  Negative for dizziness, loss of consciousness and weakness.   Endo/Heme/Allergies: Negative.  Does not bruise/bleed easily.   All other systems reviewed and are negative.       Objective:   BP (!) 168/88 (BP Location: Left arm,  "Patient Position: Sitting, BP Cuff Size: Adult)   Pulse 86   Ht 1.626 m (5' 4\")   Wt 77.5 kg (170 lb 12.8 oz)   SpO2 95%   BMI 29.32 kg/m²      Physical Exam   Constitutional: He appears well-developed and well-nourished. No distress.   HENT:   Head: Normocephalic and atraumatic.   Right Ear: External ear normal.   Left Ear: External ear normal.   Nose: Nose normal.   Mouth/Throat: No oropharyngeal exudate.   Eyes: Pupils are equal, round, and reactive to light. Conjunctivae and EOM are normal. Right eye exhibits no discharge. Left eye exhibits no discharge. No scleral icterus.   Neck: Neck supple. No JVD present.   Cardiovascular: Normal rate, regular rhythm and intact distal pulses.  Exam reveals no gallop and no friction rub.    No murmur heard.  Pulmonary/Chest: Effort normal. No stridor. No respiratory distress. He has no wheezes. He has no rales. He exhibits no tenderness.   Abdominal: Soft. He exhibits no distension. There is no guarding.   Musculoskeletal: Normal range of motion. He exhibits no edema, tenderness or deformity.   Neurological: He is alert. He has normal reflexes. He displays normal reflexes. No cranial nerve deficit. He exhibits normal muscle tone. Coordination normal.   Skin: Skin is warm and dry. No rash noted. He is not diaphoretic. No erythema. No pallor.   Psychiatric: He has a normal mood and affect. His behavior is normal. Judgment and thought content normal.   Nursing note and vitals reviewed.    November 21, 2017: Transthoracic Echo Report  2 D echo only  Normal left ventricular chamber size.  Normal left ventricular systolic function.  Left ventricular ejection fraction is visually estimated to be 65%.  Difficult to coment on certain wallmotionbut distal septum and apex   appeared mild hypokinetic.  Normal pericardium without effusion.    Lab Results   Component Value Date/Time    CHOLSTRLTOT 91 (L) 04/23/2018 07:03 AM    LDL 39 04/23/2018 07:03 AM    HDL 38 (A) 04/23/2018 07:03 " AM    TRIGLYCERIDE 69 04/23/2018 07:03 AM       Lab Results   Component Value Date/Time    SODIUM 139 05/11/2018 11:13 AM    POTASSIUM 3.9 05/11/2018 11:13 AM    CHLORIDE 105 05/11/2018 11:13 AM    CO2 26 05/11/2018 11:13 AM    GLUCOSE 121 (H) 05/11/2018 11:13 AM    BUN 14 05/11/2018 11:13 AM    CREATININE 0.78 05/11/2018 11:13 AM     Lab Results   Component Value Date/Time    ALKPHOSPHAT 71 05/11/2018 11:13 AM    ASTSGOT 20 05/11/2018 11:13 AM    ALTSGPT 18 05/11/2018 11:13 AM    TBILIRUBIN 0.7 05/11/2018 11:13 AM        Assessment:     1. Unstable angina pectoris (HCC)     2. Coronary artery disease of native artery of native heart with stable angina pectoris (HCC)  amLODIPine (NORVASC) 5 MG Tab    metoprolol (LOPRESSOR) 25 MG Tab    lisinopril (PRINIVIL) 20 MG Tab   3. Essential hypertension  amLODIPine (NORVASC) 5 MG Tab    metoprolol (LOPRESSOR) 25 MG Tab    lisinopril (PRINIVIL) 20 MG Tab   4. Mixed hyperlipidemia  atorvastatin (LIPITOR) 40 MG Tab   5. Orthopnea     6. S/P drug eluting coronary stent placement  amLODIPine (NORVASC) 5 MG Tab   7. SOB (shortness of breath)  amLODIPine (NORVASC) 5 MG Tab    atorvastatin (LIPITOR) 40 MG Tab   8. Type 2 diabetes mellitus without complication, without long-term current use of insulin (HCC)  atorvastatin (LIPITOR) 40 MG Tab   9. Palpitations  atorvastatin (LIPITOR) 40 MG Tab   10. High risk medication use         Medical Decision Making:  Today's Assessment / Status / Plan:     68-year-old male with CAD status post stent.  For his high risk medication of Plavix we will stop that today given he is completed 1 year of dual antiplatelet therapy.  I refilled the remainder of his medications for an additional year.  I do not think that there is going to be benefit from his omega-3 supplements.  I have told him to stop that as well.  We will see him back in 1 year.    1. CAD s/p stent, CARLO to LAD, [facility] to mLCx, dLCX 11/20/2017    - asa    -  stop clopidogrel 75 daily     -  atorva 40     2. HTN    - cont metop 25 BID    - cont lisin 20 BID    -  cont amlodipine 5 mg      3. HLD    - at goal     4. T2DM    - A1C 6.5    5. High Risk med    - per above    Thank for you allowing me to take part in your patient's care, please call should you have any questions or would like to discuss this patient.      Mason Diallo M.D.  [address] [place] [address] [address]  [address]  [place] [id number]  VIA Facsimile: [phone]

## 2018-11-27 NOTE — PROGRESS NOTES
Chief Complaint   Patient presents with   • Palpitations     follow up       Subjective:   Gilberto Solorzano is a 68 y.o. male who presents today As a follow-up for his CAD status post stents.  Is been 1 year since he had his stents placed.  He is completed 1 year of dual antiplatelet therapy.  He has not been have any chest pain palpitations or PND.  His blood pressure was elevated at check-in today but during his visit rechecked at 130/80.  He has no shortness of breath or lower extremity edema.  His lipids are at goal.  He does take fish oil and is asking if he can stop that because it causes him a significant fish burp.    Past Medical History:   Diagnosis Date   • Breath shortness    • Diabetes (HCC)     oral medications   • Heart burn    • Hypertension      Past Surgical History:   Procedure Laterality Date   • CARDIAC CATH  11/20/2017    Stent LAD, 2 stents to Circ.   • OTHER  2006    cervical fusion     No family history on file.  Social History     Social History   • Marital status:      Spouse name: N/A   • Number of children: N/A   • Years of education: N/A     Occupational History   • Not on file.     Social History Main Topics   • Smoking status: Never Smoker   • Smokeless tobacco: Never Used   • Alcohol use No   • Drug use: No   • Sexual activity: Not on file     Other Topics Concern   • Not on file     Social History Narrative   • No narrative on file     No Known Allergies  Outpatient Encounter Prescriptions as of 11/27/2018   Medication Sig Dispense Refill   • amLODIPine (NORVASC) 5 MG Tab Take 1 Tab by mouth every day. 90 Tab 3   • atorvastatin (LIPITOR) 40 MG Tab Take 2 Tabs by mouth every evening. 180 Tab 3   • metoprolol (LOPRESSOR) 25 MG Tab Take 1 Tab by mouth 2 Times a Day. 180 Tab 3   • lisinopril (PRINIVIL) 20 MG Tab Take 1 Tab by mouth 2 times a day. 180 Tab 3   • metformin ER (GLUCOPHAGE XR) 750 MG TABLET SR 24 HR Take 1,000 mg by mouth every day.     • omeprazole (PRILOSEC)  "20 MG delayed-release capsule Take 20 mg by mouth every day.     • Multiple Vitamins-Minerals (CENTRUM SILVER PO) Take  by mouth.     • Cholecalciferol (VITAMIN D3) 2000 UNIT Cap Take  by mouth.     • aspirin 81 MG tablet Take 81 mg by mouth every day.     • [DISCONTINUED] atorvastatin (LIPITOR) 40 MG Tab TAKE ONE TABLET BY MOUTH ONCE DAILY IN THE EVENING 90 Tab 2   • [DISCONTINUED] amLODIPine (NORVASC) 5 MG Tab Take 1 Tab by mouth every day. 90 Tab 3   • [DISCONTINUED] metoprolol (LOPRESSOR) 25 MG Tab Take 1 Tab by mouth 2 Times a Day. 180 Tab 3   • [DISCONTINUED] lisinopril (PRINIVIL) 20 MG Tab Take 1 Tab by mouth 2 times a day. 180 Tab 3   • [DISCONTINUED] clopidogrel (PLAVIX) 75 MG Tab Take 1 Tab by mouth every day. 90 Tab 5   • [DISCONTINUED] Omega-3 Fatty Acids (FISH OIL ULTRA) 1400 MG Cap Take  by mouth.       No facility-administered encounter medications on file as of 11/27/2018.      Review of Systems   Constitutional: Negative.  Negative for chills, fever and malaise/fatigue.   HENT: Negative.  Negative for sore throat.    Eyes: Negative.    Respiratory: Negative.  Negative for cough, hemoptysis, sputum production, shortness of breath, wheezing and stridor.    Cardiovascular: Negative.  Negative for chest pain, palpitations, orthopnea, claudication, leg swelling and PND.   Gastrointestinal: Negative.    Genitourinary: Negative.    Musculoskeletal: Negative.    Skin: Negative.    Neurological: Negative.  Negative for dizziness, loss of consciousness and weakness.   Endo/Heme/Allergies: Negative.  Does not bruise/bleed easily.   All other systems reviewed and are negative.       Objective:   BP (!) 168/88 (BP Location: Left arm, Patient Position: Sitting, BP Cuff Size: Adult)   Pulse 86   Ht 1.626 m (5' 4\")   Wt 77.5 kg (170 lb 12.8 oz)   SpO2 95%   BMI 29.32 kg/m²     Physical Exam   Constitutional: He appears well-developed and well-nourished. No distress.   HENT:   Head: Normocephalic and " atraumatic.   Right Ear: External ear normal.   Left Ear: External ear normal.   Nose: Nose normal.   Mouth/Throat: No oropharyngeal exudate.   Eyes: Pupils are equal, round, and reactive to light. Conjunctivae and EOM are normal. Right eye exhibits no discharge. Left eye exhibits no discharge. No scleral icterus.   Neck: Neck supple. No JVD present.   Cardiovascular: Normal rate, regular rhythm and intact distal pulses.  Exam reveals no gallop and no friction rub.    No murmur heard.  Pulmonary/Chest: Effort normal. No stridor. No respiratory distress. He has no wheezes. He has no rales. He exhibits no tenderness.   Abdominal: Soft. He exhibits no distension. There is no guarding.   Musculoskeletal: Normal range of motion. He exhibits no edema, tenderness or deformity.   Neurological: He is alert. He has normal reflexes. He displays normal reflexes. No cranial nerve deficit. He exhibits normal muscle tone. Coordination normal.   Skin: Skin is warm and dry. No rash noted. He is not diaphoretic. No erythema. No pallor.   Psychiatric: He has a normal mood and affect. His behavior is normal. Judgment and thought content normal.   Nursing note and vitals reviewed.    November 21, 2017: Transthoracic Echo Report  2 D echo only  Normal left ventricular chamber size.  Normal left ventricular systolic function.  Left ventricular ejection fraction is visually estimated to be 65%.  Difficult to coment on certain wallmotionbut distal septum and apex   appeared mild hypokinetic.  Normal pericardium without effusion.    Lab Results   Component Value Date/Time    CHOLSTRLTOT 91 (L) 04/23/2018 07:03 AM    LDL 39 04/23/2018 07:03 AM    HDL 38 (A) 04/23/2018 07:03 AM    TRIGLYCERIDE 69 04/23/2018 07:03 AM       Lab Results   Component Value Date/Time    SODIUM 139 05/11/2018 11:13 AM    POTASSIUM 3.9 05/11/2018 11:13 AM    CHLORIDE 105 05/11/2018 11:13 AM    CO2 26 05/11/2018 11:13 AM    GLUCOSE 121 (H) 05/11/2018 11:13 AM    BUN 14  05/11/2018 11:13 AM    CREATININE 0.78 05/11/2018 11:13 AM     Lab Results   Component Value Date/Time    ALKPHOSPHAT 71 05/11/2018 11:13 AM    ASTSGOT 20 05/11/2018 11:13 AM    ALTSGPT 18 05/11/2018 11:13 AM    TBILIRUBIN 0.7 05/11/2018 11:13 AM        Assessment:     1. Unstable angina pectoris (HCC)     2. Coronary artery disease of native artery of native heart with stable angina pectoris (HCC)  amLODIPine (NORVASC) 5 MG Tab    metoprolol (LOPRESSOR) 25 MG Tab    lisinopril (PRINIVIL) 20 MG Tab   3. Essential hypertension  amLODIPine (NORVASC) 5 MG Tab    metoprolol (LOPRESSOR) 25 MG Tab    lisinopril (PRINIVIL) 20 MG Tab   4. Mixed hyperlipidemia  atorvastatin (LIPITOR) 40 MG Tab   5. Orthopnea     6. S/P drug eluting coronary stent placement  amLODIPine (NORVASC) 5 MG Tab   7. SOB (shortness of breath)  amLODIPine (NORVASC) 5 MG Tab    atorvastatin (LIPITOR) 40 MG Tab   8. Type 2 diabetes mellitus without complication, without long-term current use of insulin (HCC)  atorvastatin (LIPITOR) 40 MG Tab   9. Palpitations  atorvastatin (LIPITOR) 40 MG Tab   10. High risk medication use         Medical Decision Making:  Today's Assessment / Status / Plan:     68-year-old male with CAD status post stent.  For his high risk medication of Plavix we will stop that today given he is completed 1 year of dual antiplatelet therapy.  I refilled the remainder of his medications for an additional year.  I do not think that there is going to be benefit from his omega-3 supplements.  I have told him to stop that as well.  We will see him back in 1 year.    1. CAD s/p stent, [facility] to LAD, [facility] to mLCx, dLCX 11/20/2017    - asa    - stop clopidogrel 75 daily     - atorva 40     2. HTN    - cont metop 25 BID    - cont lisin 20 BID    - cont amlodipine 5 mg      3. HLD    - at goal     4. T2DM    - A1C 6.5    5. High Risk med    - per above    Thank for you allowing me to take part in your patient's care, please call should you have any  questions or would like to discuss this patient.

## 2018-12-06 ENCOUNTER — IMMUNIZATION (OUTPATIENT)
Dept: SOCIAL WORK | Facility: CLINIC | Age: 68
End: 2018-12-06
Payer: MEDICARE

## 2018-12-06 DIAGNOSIS — Z23 NEED FOR VACCINATION: ICD-10-CM

## 2018-12-06 PROCEDURE — 90662 IIV NO PRSV INCREASED AG IM: CPT | Performed by: REGISTERED NURSE

## 2018-12-06 PROCEDURE — G0008 ADMIN INFLUENZA VIRUS VAC: HCPCS | Performed by: REGISTERED NURSE

## 2019-03-11 DIAGNOSIS — I10 ESSENTIAL HYPERTENSION: ICD-10-CM

## 2019-03-11 DIAGNOSIS — I25.118 CORONARY ARTERY DISEASE OF NATIVE ARTERY OF NATIVE HEART WITH STABLE ANGINA PECTORIS (HCC): ICD-10-CM

## 2019-03-11 RX ORDER — LISINOPRIL 20 MG/1
20 TABLET ORAL 2 TIMES DAILY
Qty: 200 TAB | Refills: 3 | Status: SHIPPED | OUTPATIENT
Start: 2019-03-11 | End: 2019-03-19 | Stop reason: SDUPTHER

## 2019-03-19 DIAGNOSIS — I10 ESSENTIAL HYPERTENSION: ICD-10-CM

## 2019-03-19 DIAGNOSIS — I25.118 CORONARY ARTERY DISEASE OF NATIVE ARTERY OF NATIVE HEART WITH STABLE ANGINA PECTORIS (HCC): ICD-10-CM

## 2019-03-19 RX ORDER — LISINOPRIL 20 MG/1
20 TABLET ORAL 2 TIMES DAILY
Qty: 200 TAB | Refills: 3 | Status: SHIPPED | OUTPATIENT
Start: 2019-03-19 | End: 2019-11-20 | Stop reason: SDUPTHER

## 2019-04-02 DIAGNOSIS — E78.2 MIXED HYPERLIPIDEMIA: ICD-10-CM

## 2019-04-02 DIAGNOSIS — R06.02 SOB (SHORTNESS OF BREATH): ICD-10-CM

## 2019-04-02 DIAGNOSIS — R00.2 PALPITATIONS: ICD-10-CM

## 2019-04-02 DIAGNOSIS — E11.9 TYPE 2 DIABETES MELLITUS WITHOUT COMPLICATION, WITHOUT LONG-TERM CURRENT USE OF INSULIN (HCC): ICD-10-CM

## 2019-04-02 RX ORDER — ATORVASTATIN CALCIUM 40 MG/1
40 TABLET, FILM COATED ORAL EVERY EVENING
Qty: 100 TAB | Refills: 3 | Status: SHIPPED | OUTPATIENT
Start: 2019-04-02 | End: 2019-11-20 | Stop reason: SDUPTHER

## 2019-04-11 ENCOUNTER — HOSPITAL ENCOUNTER (OUTPATIENT)
Dept: LAB | Facility: MEDICAL CENTER | Age: 69
End: 2019-04-11
Attending: FAMILY MEDICINE
Payer: MEDICARE

## 2019-04-11 LAB
APPEARANCE UR: CLEAR
BACTERIA #/AREA URNS HPF: NEGATIVE /HPF
BASOPHILS # BLD AUTO: 1.9 % (ref 0–1.8)
BASOPHILS # BLD: 0.11 K/UL (ref 0–0.12)
BILIRUB UR QL STRIP.AUTO: NEGATIVE
COLOR UR: YELLOW
EOSINOPHIL # BLD AUTO: 0.44 K/UL (ref 0–0.51)
EOSINOPHIL NFR BLD: 7.7 % (ref 0–6.9)
EPI CELLS #/AREA URNS HPF: NEGATIVE /HPF
ERYTHROCYTE [DISTWIDTH] IN BLOOD BY AUTOMATED COUNT: 55.7 FL (ref 35.9–50)
EST. AVERAGE GLUCOSE BLD GHB EST-MCNC: 146 MG/DL
GLUCOSE UR STRIP.AUTO-MCNC: NEGATIVE MG/DL
HBA1C MFR BLD: 6.7 % (ref 0–5.6)
HCT VFR BLD AUTO: 40.4 % (ref 42–52)
HGB BLD-MCNC: 12.2 G/DL (ref 14–18)
HYALINE CASTS #/AREA URNS LPF: ABNORMAL /LPF
IMM GRANULOCYTES # BLD AUTO: 0.01 K/UL (ref 0–0.11)
IMM GRANULOCYTES NFR BLD AUTO: 0.2 % (ref 0–0.9)
KETONES UR STRIP.AUTO-MCNC: NEGATIVE MG/DL
LEUKOCYTE ESTERASE UR QL STRIP.AUTO: ABNORMAL
LYMPHOCYTES # BLD AUTO: 2.48 K/UL (ref 1–4.8)
LYMPHOCYTES NFR BLD: 43.5 % (ref 22–41)
MCH RBC QN AUTO: 25.6 PG (ref 27–33)
MCHC RBC AUTO-ENTMCNC: 30.2 G/DL (ref 33.7–35.3)
MCV RBC AUTO: 84.9 FL (ref 81.4–97.8)
MICRO URNS: ABNORMAL
MONOCYTES # BLD AUTO: 0.4 K/UL (ref 0–0.85)
MONOCYTES NFR BLD AUTO: 7 % (ref 0–13.4)
NEUTROPHILS # BLD AUTO: 2.26 K/UL (ref 1.82–7.42)
NEUTROPHILS NFR BLD: 39.7 % (ref 44–72)
NITRITE UR QL STRIP.AUTO: NEGATIVE
NRBC # BLD AUTO: 0 K/UL
NRBC BLD-RTO: 0 /100 WBC
PH UR STRIP.AUTO: 5.5 [PH]
PLATELET # BLD AUTO: 205 K/UL (ref 164–446)
PMV BLD AUTO: 12.8 FL (ref 9–12.9)
PROT UR QL STRIP: NEGATIVE MG/DL
PSA SERPL-MCNC: 0.99 NG/ML (ref 0–4)
RBC # BLD AUTO: 4.76 M/UL (ref 4.7–6.1)
RBC # URNS HPF: ABNORMAL /HPF
RBC UR QL AUTO: NEGATIVE
SP GR UR STRIP.AUTO: 1.02
TSH SERPL DL<=0.005 MIU/L-ACNC: 2.89 UIU/ML (ref 0.38–5.33)
UROBILINOGEN UR STRIP.AUTO-MCNC: 0.2 MG/DL
WBC # BLD AUTO: 5.7 K/UL (ref 4.8–10.8)
WBC #/AREA URNS HPF: ABNORMAL /HPF

## 2019-04-11 PROCEDURE — 36415 COLL VENOUS BLD VENIPUNCTURE: CPT

## 2019-04-11 PROCEDURE — 81001 URINALYSIS AUTO W/SCOPE: CPT

## 2019-04-11 PROCEDURE — 83036 HEMOGLOBIN GLYCOSYLATED A1C: CPT

## 2019-04-11 PROCEDURE — 84443 ASSAY THYROID STIM HORMONE: CPT

## 2019-04-11 PROCEDURE — 80061 LIPID PANEL: CPT

## 2019-04-11 PROCEDURE — 84153 ASSAY OF PSA TOTAL: CPT

## 2019-04-11 PROCEDURE — 80053 COMPREHEN METABOLIC PANEL: CPT

## 2019-04-11 PROCEDURE — 85025 COMPLETE CBC W/AUTO DIFF WBC: CPT

## 2019-04-12 LAB
ALBUMIN SERPL BCP-MCNC: 4.2 G/DL (ref 3.2–4.9)
ALBUMIN/GLOB SERPL: 1.4 G/DL
ALP SERPL-CCNC: 52 U/L (ref 30–99)
ALT SERPL-CCNC: 23 U/L (ref 2–50)
ANION GAP SERPL CALC-SCNC: 8 MMOL/L (ref 0–11.9)
AST SERPL-CCNC: 24 U/L (ref 12–45)
BILIRUB SERPL-MCNC: 0.8 MG/DL (ref 0.1–1.5)
BUN SERPL-MCNC: 15 MG/DL (ref 8–22)
CALCIUM SERPL-MCNC: 9.1 MG/DL (ref 8.5–10.5)
CHLORIDE SERPL-SCNC: 106 MMOL/L (ref 96–112)
CHOLEST SERPL-MCNC: 83 MG/DL (ref 100–199)
CO2 SERPL-SCNC: 28 MMOL/L (ref 20–33)
CREAT SERPL-MCNC: 0.74 MG/DL (ref 0.5–1.4)
GLOBULIN SER CALC-MCNC: 2.9 G/DL (ref 1.9–3.5)
GLUCOSE SERPL-MCNC: 94 MG/DL (ref 65–99)
HDLC SERPL-MCNC: 34 MG/DL
LDLC SERPL CALC-MCNC: 36 MG/DL
POTASSIUM SERPL-SCNC: 4.2 MMOL/L (ref 3.6–5.5)
PROT SERPL-MCNC: 7.1 G/DL (ref 6–8.2)
SODIUM SERPL-SCNC: 142 MMOL/L (ref 135–145)
TRIGL SERPL-MCNC: 64 MG/DL (ref 0–149)

## 2019-06-06 ENCOUNTER — HOSPITAL ENCOUNTER (OUTPATIENT)
Dept: LAB | Facility: MEDICAL CENTER | Age: 69
End: 2019-06-06
Attending: FAMILY MEDICINE
Payer: MEDICARE

## 2019-06-06 LAB
APPEARANCE UR: ABNORMAL
BACTERIA #/AREA URNS HPF: NEGATIVE /HPF
BILIRUB UR QL STRIP.AUTO: NEGATIVE
COLOR UR: ABNORMAL
EPI CELLS #/AREA URNS HPF: NEGATIVE /HPF
GLUCOSE UR STRIP.AUTO-MCNC: NEGATIVE MG/DL
HYALINE CASTS #/AREA URNS LPF: ABNORMAL /LPF
KETONES UR STRIP.AUTO-MCNC: ABNORMAL MG/DL
LEUKOCYTE ESTERASE UR QL STRIP.AUTO: ABNORMAL
MICRO URNS: ABNORMAL
NITRITE UR QL STRIP.AUTO: NEGATIVE
PH UR STRIP.AUTO: 5 [PH]
PROT UR QL STRIP: 30 MG/DL
RBC # URNS HPF: ABNORMAL /HPF
RBC UR QL AUTO: NEGATIVE
SP GR UR STRIP.AUTO: 1.04
UROBILINOGEN UR STRIP.AUTO-MCNC: 1 MG/DL
WBC #/AREA URNS HPF: ABNORMAL /HPF

## 2019-06-06 PROCEDURE — 87077 CULTURE AEROBIC IDENTIFY: CPT

## 2019-06-06 PROCEDURE — 87086 URINE CULTURE/COLONY COUNT: CPT

## 2019-06-06 PROCEDURE — 81001 URINALYSIS AUTO W/SCOPE: CPT

## 2019-06-08 LAB
BACTERIA UR CULT: NORMAL
SIGNIFICANT IND 70042: NORMAL
SITE SITE: NORMAL
SOURCE SOURCE: NORMAL

## 2019-06-27 ENCOUNTER — HOSPITAL ENCOUNTER (OUTPATIENT)
Dept: LAB | Facility: MEDICAL CENTER | Age: 69
End: 2019-06-27
Attending: UROLOGY
Payer: MEDICARE

## 2019-06-27 LAB
APPEARANCE UR: CLEAR
BACTERIA #/AREA URNS HPF: NEGATIVE /HPF
BILIRUB UR QL STRIP.AUTO: NEGATIVE
COLOR UR: YELLOW
EPI CELLS #/AREA URNS HPF: NEGATIVE /HPF
GLUCOSE UR STRIP.AUTO-MCNC: NEGATIVE MG/DL
HYALINE CASTS #/AREA URNS LPF: ABNORMAL /LPF
KETONES UR STRIP.AUTO-MCNC: NEGATIVE MG/DL
LEUKOCYTE ESTERASE UR QL STRIP.AUTO: ABNORMAL
MICRO URNS: ABNORMAL
NITRITE UR QL STRIP.AUTO: NEGATIVE
PH UR STRIP.AUTO: 5.5 [PH]
PROT UR QL STRIP: NEGATIVE MG/DL
RBC # URNS HPF: ABNORMAL /HPF
RBC UR QL AUTO: NEGATIVE
SP GR UR STRIP.AUTO: 1.01
UROBILINOGEN UR STRIP.AUTO-MCNC: 0.2 MG/DL
WBC #/AREA URNS HPF: ABNORMAL /HPF

## 2019-06-27 PROCEDURE — 81001 URINALYSIS AUTO W/SCOPE: CPT

## 2019-08-16 ENCOUNTER — HOSPITAL ENCOUNTER (OUTPATIENT)
Dept: LAB | Facility: MEDICAL CENTER | Age: 69
End: 2019-08-16
Attending: UROLOGY
Payer: MEDICARE

## 2019-08-16 LAB
AMORPH CRY #/AREA URNS HPF: PRESENT /HPF
APPEARANCE UR: ABNORMAL
BACTERIA #/AREA URNS HPF: NEGATIVE /HPF
BILIRUB UR QL STRIP.AUTO: NEGATIVE
CAOX CRY #/AREA URNS HPF: ABNORMAL /HPF
COLOR UR: YELLOW
EPI CELLS #/AREA URNS HPF: NEGATIVE /HPF
GLUCOSE UR STRIP.AUTO-MCNC: NEGATIVE MG/DL
HYALINE CASTS #/AREA URNS LPF: ABNORMAL /LPF
KETONES UR STRIP.AUTO-MCNC: NEGATIVE MG/DL
LEUKOCYTE ESTERASE UR QL STRIP.AUTO: ABNORMAL
MICRO URNS: ABNORMAL
NITRITE UR QL STRIP.AUTO: NEGATIVE
PH UR STRIP.AUTO: 5 [PH] (ref 5–8)
PROT UR QL STRIP: NEGATIVE MG/DL
RBC # URNS HPF: ABNORMAL /HPF
RBC UR QL AUTO: NEGATIVE
SP GR UR STRIP.AUTO: 1.03
UROBILINOGEN UR STRIP.AUTO-MCNC: 0.2 MG/DL
WBC #/AREA URNS HPF: ABNORMAL /HPF

## 2019-08-16 PROCEDURE — 81001 URINALYSIS AUTO W/SCOPE: CPT

## 2019-08-20 ENCOUNTER — HOSPITAL ENCOUNTER (OUTPATIENT)
Dept: LAB | Facility: MEDICAL CENTER | Age: 69
End: 2019-08-20
Attending: UROLOGY
Payer: MEDICARE

## 2019-08-20 LAB — CREAT SERPL-MCNC: 0.81 MG/DL (ref 0.5–1.4)

## 2019-08-20 PROCEDURE — 36415 COLL VENOUS BLD VENIPUNCTURE: CPT

## 2019-08-20 PROCEDURE — 82565 ASSAY OF CREATININE: CPT

## 2019-08-21 ENCOUNTER — HOSPITAL ENCOUNTER (OUTPATIENT)
Dept: RADIOLOGY | Facility: MEDICAL CENTER | Age: 69
End: 2019-08-21
Attending: UROLOGY
Payer: MEDICARE

## 2019-08-21 DIAGNOSIS — R31.21 ASYMPTOMATIC MICROSCOPIC HEMATURIA: ICD-10-CM

## 2019-08-21 PROCEDURE — 700117 HCHG RX CONTRAST REV CODE 255: Performed by: UROLOGY

## 2019-08-21 PROCEDURE — 74176 CT ABD & PELVIS W/O CONTRAST: CPT

## 2019-08-21 RX ADMIN — IOHEXOL 100 ML: 350 INJECTION, SOLUTION INTRAVENOUS at 15:43

## 2019-10-26 ENCOUNTER — HOSPITAL ENCOUNTER (OUTPATIENT)
Dept: LAB | Facility: MEDICAL CENTER | Age: 69
End: 2019-10-26
Attending: FAMILY MEDICINE
Payer: MEDICARE

## 2019-10-26 LAB
EST. AVERAGE GLUCOSE BLD GHB EST-MCNC: 140 MG/DL
HBA1C MFR BLD: 6.5 % (ref 0–5.6)

## 2019-10-26 PROCEDURE — 83036 HEMOGLOBIN GLYCOSYLATED A1C: CPT

## 2019-10-26 PROCEDURE — 36415 COLL VENOUS BLD VENIPUNCTURE: CPT

## 2019-11-20 ENCOUNTER — IMMUNIZATION (OUTPATIENT)
Dept: SOCIAL WORK | Facility: CLINIC | Age: 69
End: 2019-11-20
Payer: MEDICARE

## 2019-11-20 ENCOUNTER — OFFICE VISIT (OUTPATIENT)
Dept: CARDIOLOGY | Facility: MEDICAL CENTER | Age: 69
End: 2019-11-20
Payer: MEDICARE

## 2019-11-20 VITALS
WEIGHT: 169 LBS | HEIGHT: 64 IN | DIASTOLIC BLOOD PRESSURE: 88 MMHG | OXYGEN SATURATION: 97 % | HEART RATE: 74 BPM | SYSTOLIC BLOOD PRESSURE: 162 MMHG | BODY MASS INDEX: 28.85 KG/M2

## 2019-11-20 DIAGNOSIS — I10 ESSENTIAL HYPERTENSION: ICD-10-CM

## 2019-11-20 DIAGNOSIS — I25.118 CORONARY ARTERY DISEASE OF NATIVE ARTERY OF NATIVE HEART WITH STABLE ANGINA PECTORIS (HCC): ICD-10-CM

## 2019-11-20 DIAGNOSIS — Z23 NEED FOR VACCINATION: ICD-10-CM

## 2019-11-20 DIAGNOSIS — I20.0 UNSTABLE ANGINA PECTORIS (HCC): ICD-10-CM

## 2019-11-20 DIAGNOSIS — R00.2 PALPITATIONS: ICD-10-CM

## 2019-11-20 DIAGNOSIS — E78.2 MIXED HYPERLIPIDEMIA: ICD-10-CM

## 2019-11-20 DIAGNOSIS — Z95.5 S/P DRUG ELUTING CORONARY STENT PLACEMENT: ICD-10-CM

## 2019-11-20 DIAGNOSIS — Z79.899 HIGH RISK MEDICATION USE: ICD-10-CM

## 2019-11-20 DIAGNOSIS — R06.02 SOB (SHORTNESS OF BREATH): ICD-10-CM

## 2019-11-20 DIAGNOSIS — E11.9 TYPE 2 DIABETES MELLITUS WITHOUT COMPLICATION, WITHOUT LONG-TERM CURRENT USE OF INSULIN (HCC): ICD-10-CM

## 2019-11-20 PROCEDURE — G0008 ADMIN INFLUENZA VIRUS VAC: HCPCS | Performed by: REGISTERED NURSE

## 2019-11-20 PROCEDURE — 99215 OFFICE O/P EST HI 40 MIN: CPT | Performed by: INTERNAL MEDICINE

## 2019-11-20 PROCEDURE — 90662 IIV NO PRSV INCREASED AG IM: CPT | Performed by: REGISTERED NURSE

## 2019-11-20 RX ORDER — ATORVASTATIN CALCIUM 40 MG/1
40 TABLET, FILM COATED ORAL EVERY EVENING
Qty: 100 TAB | Refills: 3 | Status: SHIPPED | OUTPATIENT
Start: 2019-11-20 | End: 2020-11-25

## 2019-11-20 RX ORDER — CHLORAL HYDRATE 500 MG
1000 CAPSULE ORAL DAILY
COMMUNITY

## 2019-11-20 RX ORDER — LISINOPRIL 20 MG/1
20 TABLET ORAL 2 TIMES DAILY
Qty: 180 TAB | Refills: 3 | Status: SHIPPED | OUTPATIENT
Start: 2019-11-20 | End: 2020-05-29 | Stop reason: SDUPTHER

## 2019-11-20 RX ORDER — AMLODIPINE BESYLATE 5 MG/1
5 TABLET ORAL DAILY
Qty: 90 TAB | Refills: 3 | Status: SHIPPED | OUTPATIENT
Start: 2019-11-20 | End: 2020-11-25

## 2019-11-20 ASSESSMENT — ENCOUNTER SYMPTOMS
CHILLS: 0
HEMOPTYSIS: 0
SHORTNESS OF BREATH: 0
WEAKNESS: 0
BRUISES/BLEEDS EASILY: 0
SORE THROAT: 0
SPUTUM PRODUCTION: 0
PND: 0
ORTHOPNEA: 0
RESPIRATORY NEGATIVE: 1
NEUROLOGICAL NEGATIVE: 1
EYES NEGATIVE: 1
FEVER: 0
WHEEZING: 0
CLAUDICATION: 0
STRIDOR: 0
PALPITATIONS: 0
DIZZINESS: 0
MUSCULOSKELETAL NEGATIVE: 1
GASTROINTESTINAL NEGATIVE: 1
CONSTITUTIONAL NEGATIVE: 1
CARDIOVASCULAR NEGATIVE: 1
LOSS OF CONSCIOUSNESS: 0
COUGH: 0

## 2019-11-20 NOTE — PROGRESS NOTES
Chief Complaint   Patient presents with   • Angina (Unstable)     F/V: 1 YR       Subjective:   Gilberto Solorzano is a 69 y.o. male who presents today as follow-up for CAD status post stent with hypertension hyperlipidemia and type 2 diabetes.  Since he was last seen 1 year ago he is doing well.  His last LDL was at goal.  He said no chest pain palpitations or PND.  His blood pressure is elevated today.  Every time his had a checked outside of this clinic its been normal at goal.  He also checks at home and notices normal blood pressures.  Otherwise he is doing well with no changes to his clinical status.    Past Medical History:   Diagnosis Date   • Breath shortness    • Diabetes (HCC)     oral medications   • Heart burn    • Hypertension      Past Surgical History:   Procedure Laterality Date   • Z CARDIAC CATH  11/20/2017    Stent LAD, 2 stents to Circ.   • OTHER  2006    cervical fusion     History reviewed. No pertinent family history.  Social History     Socioeconomic History   • Marital status:      Spouse name: Not on file   • Number of children: Not on file   • Years of education: Not on file   • Highest education level: Not on file   Occupational History   • Not on file   Social Needs   • Financial resource strain: Not on file   • Food insecurity:     Worry: Not on file     Inability: Not on file   • Transportation needs:     Medical: Not on file     Non-medical: Not on file   Tobacco Use   • Smoking status: Never Smoker   • Smokeless tobacco: Never Used   Substance and Sexual Activity   • Alcohol use: No   • Drug use: No   • Sexual activity: Not on file   Lifestyle   • Physical activity:     Days per week: Not on file     Minutes per session: Not on file   • Stress: Not on file   Relationships   • Social connections:     Talks on phone: Not on file     Gets together: Not on file     Attends Islam service: Not on file     Active member of club or organization: Not on file     Attends  meetings of clubs or organizations: Not on file     Relationship status: Not on file   • Intimate partner violence:     Fear of current or ex partner: Not on file     Emotionally abused: Not on file     Physically abused: Not on file     Forced sexual activity: Not on file   Other Topics Concern   • Not on file   Social History Narrative   • Not on file     No Known Allergies  Outpatient Encounter Medications as of 11/20/2019   Medication Sig Dispense Refill   • Omega-3 Fatty Acids (FISH OIL) 1000 MG Cap capsule Take 1,000 mg by mouth every day.     • lisinopril (PRINIVIL) 20 MG Tab Take 1 Tab by mouth 2 times a day. 180 Tab 3   • metoprolol (LOPRESSOR) 25 MG Tab Take 1 Tab by mouth 2 Times a Day. 180 Tab 3   • atorvastatin (LIPITOR) 40 MG Tab Take 1 Tab by mouth every evening. 100 Tab 3   • amLODIPine (NORVASC) 5 MG Tab Take 1 Tab by mouth every day. 90 Tab 3   • metformin ER (GLUCOPHAGE XR) 750 MG TABLET SR 24 HR Take 1,000 mg by mouth every day.     • omeprazole (PRILOSEC) 20 MG delayed-release capsule Take 20 mg by mouth every day.     • Multiple Vitamins-Minerals (CENTRUM SILVER PO) Take  by mouth.     • Cholecalciferol (VITAMIN D3) 2000 UNIT Cap Take  by mouth.     • aspirin 81 MG tablet Take 81 mg by mouth every day.     • [DISCONTINUED] atorvastatin (LIPITOR) 40 MG Tab Take 1 Tab by mouth every evening. 100 Tab 3   • [DISCONTINUED] lisinopril (PRINIVIL) 20 MG Tab Take 1 Tab by mouth 2 times a day. 200 Tab 3   • [DISCONTINUED] amLODIPine (NORVASC) 5 MG Tab Take 1 Tab by mouth every day. 90 Tab 3   • [DISCONTINUED] metoprolol (LOPRESSOR) 25 MG Tab Take 1 Tab by mouth 2 Times a Day. 180 Tab 3     No facility-administered encounter medications on file as of 11/20/2019.      Review of Systems   Constitutional: Negative.  Negative for chills, fever and malaise/fatigue.   HENT: Negative.  Negative for sore throat.    Eyes: Negative.    Respiratory: Negative.  Negative for cough, hemoptysis, sputum production,  "shortness of breath, wheezing and stridor.    Cardiovascular: Negative.  Negative for chest pain, palpitations, orthopnea, claudication, leg swelling and PND.   Gastrointestinal: Negative.    Genitourinary: Negative.    Musculoskeletal: Negative.    Skin: Negative.    Neurological: Negative.  Negative for dizziness, loss of consciousness and weakness.   Endo/Heme/Allergies: Negative.  Does not bruise/bleed easily.   All other systems reviewed and are negative.       Objective:   BP (!) 162/88 (BP Location: Left arm, Patient Position: Sitting, BP Cuff Size: Adult)   Pulse 74   Ht 1.626 m (5' 4\")   Wt 76.7 kg (169 lb)   SpO2 97%   BMI 29.01 kg/m²     Physical Exam   Constitutional: He appears well-developed and well-nourished. No distress.   HENT:   Head: Normocephalic and atraumatic.   Right Ear: External ear normal.   Left Ear: External ear normal.   Nose: Nose normal.   Mouth/Throat: No oropharyngeal exudate.   Eyes: Pupils are equal, round, and reactive to light. Conjunctivae and EOM are normal. Right eye exhibits no discharge. Left eye exhibits no discharge. No scleral icterus.   Neck: Neck supple. No JVD present.   Cardiovascular: Normal rate, regular rhythm and intact distal pulses. Exam reveals no gallop and no friction rub.   No murmur heard.  Pulmonary/Chest: Effort normal. No stridor. No respiratory distress. He has no wheezes. He has no rales. He exhibits no tenderness.   Abdominal: Soft. He exhibits no distension. There is no guarding.   Musculoskeletal: Normal range of motion.         General: No tenderness, deformity or edema.   Neurological: He is alert. He has normal reflexes. He displays normal reflexes. No cranial nerve deficit. He exhibits normal muscle tone. Coordination normal.   Skin: Skin is warm and dry. No rash noted. He is not diaphoretic. No erythema. No pallor.   Psychiatric: He has a normal mood and affect. His behavior is normal. Judgment and thought content normal.   Nursing note " and vitals reviewed.      Assessment:     1. High risk medication use     2. Essential hypertension  lisinopril (PRINIVIL) 20 MG Tab    metoprolol (LOPRESSOR) 25 MG Tab    amLODIPine (NORVASC) 5 MG Tab   3. Coronary artery disease of native artery of native heart with stable angina pectoris (HCC)  lisinopril (PRINIVIL) 20 MG Tab    metoprolol (LOPRESSOR) 25 MG Tab    amLODIPine (NORVASC) 5 MG Tab   4. Unstable angina pectoris (HCC)     5. Mixed hyperlipidemia  atorvastatin (LIPITOR) 40 MG Tab   6. S/P drug eluting coronary stent placement  amLODIPine (NORVASC) 5 MG Tab   7. Type 2 diabetes mellitus without complication, without long-term current use of insulin (HCC)  atorvastatin (LIPITOR) 40 MG Tab   8. SOB (shortness of breath)  atorvastatin (LIPITOR) 40 MG Tab    amLODIPine (NORVASC) 5 MG Tab   9. Palpitations  atorvastatin (LIPITOR) 40 MG Tab       Medical Decision Making:  Today's Assessment / Status / Plan:     5-year-old male with CAD status post stents to his LAD doing well.  For his blood pressure he will continue to follow this.  He was requested a 1 year supply refills be sent to his pharmacy after December 1.  We provided that today.  Otherwise we will see him back in 1 year.    1. CAD s/p stent, [facility] to LAD, [facility] to mLCx, dLCX 11/20/2017    - asa    - atorva 40     2. HTN    - cont metop 25 BID    - cont lisin 20 BID    - cont amlodipine 5 mg     3. HLD    - at goal     4. T2DM    - A1C 6.5     5. High Risk med    - per above

## 2020-05-29 DIAGNOSIS — I25.118 CORONARY ARTERY DISEASE OF NATIVE ARTERY OF NATIVE HEART WITH STABLE ANGINA PECTORIS (HCC): ICD-10-CM

## 2020-05-29 DIAGNOSIS — I10 ESSENTIAL HYPERTENSION: ICD-10-CM

## 2020-05-29 RX ORDER — LISINOPRIL 20 MG/1
20 TABLET ORAL 2 TIMES DAILY
Qty: 200 TAB | Refills: 1 | Status: SHIPPED | OUTPATIENT
Start: 2020-05-29 | End: 2021-01-27 | Stop reason: SDUPTHER

## 2020-09-03 ENCOUNTER — HOSPITAL ENCOUNTER (OUTPATIENT)
Dept: LAB | Facility: MEDICAL CENTER | Age: 70
End: 2020-09-03
Attending: FAMILY MEDICINE
Payer: MEDICARE

## 2020-09-03 LAB
EST. AVERAGE GLUCOSE BLD GHB EST-MCNC: 137 MG/DL
HBA1C MFR BLD: 6.4 % (ref 0–5.6)

## 2020-09-03 PROCEDURE — 83036 HEMOGLOBIN GLYCOSYLATED A1C: CPT

## 2020-09-03 PROCEDURE — 36415 COLL VENOUS BLD VENIPUNCTURE: CPT

## 2020-09-30 ENCOUNTER — IMMUNIZATION (OUTPATIENT)
Dept: SOCIAL WORK | Facility: CLINIC | Age: 70
End: 2020-09-30
Payer: MEDICARE

## 2020-09-30 DIAGNOSIS — Z23 NEED FOR VACCINATION: ICD-10-CM

## 2020-09-30 PROCEDURE — G0008 ADMIN INFLUENZA VIRUS VAC: HCPCS | Performed by: REGISTERED NURSE

## 2020-09-30 PROCEDURE — 90662 IIV NO PRSV INCREASED AG IM: CPT | Performed by: REGISTERED NURSE

## 2020-11-25 DIAGNOSIS — E11.9 TYPE 2 DIABETES MELLITUS WITHOUT COMPLICATION, WITHOUT LONG-TERM CURRENT USE OF INSULIN (HCC): ICD-10-CM

## 2020-11-25 DIAGNOSIS — R00.2 PALPITATIONS: ICD-10-CM

## 2020-11-25 DIAGNOSIS — I10 ESSENTIAL HYPERTENSION: ICD-10-CM

## 2020-11-25 DIAGNOSIS — I25.118 CORONARY ARTERY DISEASE OF NATIVE ARTERY OF NATIVE HEART WITH STABLE ANGINA PECTORIS (HCC): ICD-10-CM

## 2020-11-25 DIAGNOSIS — R06.02 SOB (SHORTNESS OF BREATH): ICD-10-CM

## 2020-11-25 DIAGNOSIS — Z95.5 S/P DRUG ELUTING CORONARY STENT PLACEMENT: ICD-10-CM

## 2020-11-25 DIAGNOSIS — E78.2 MIXED HYPERLIPIDEMIA: ICD-10-CM

## 2020-11-27 RX ORDER — AMLODIPINE BESYLATE 5 MG/1
TABLET ORAL
Qty: 100 TAB | Refills: 0 | Status: SHIPPED | OUTPATIENT
Start: 2020-11-27 | End: 2021-01-27 | Stop reason: SDUPTHER

## 2020-11-27 RX ORDER — ATORVASTATIN CALCIUM 40 MG/1
TABLET, FILM COATED ORAL
Qty: 100 TAB | Refills: 0 | Status: SHIPPED | OUTPATIENT
Start: 2020-11-27 | End: 2021-01-27 | Stop reason: SDUPTHER

## 2021-01-15 DIAGNOSIS — Z23 NEED FOR VACCINATION: ICD-10-CM

## 2021-01-27 ENCOUNTER — TELEMEDICINE (OUTPATIENT)
Dept: CARDIOLOGY | Facility: MEDICAL CENTER | Age: 71
End: 2021-01-27
Payer: MEDICARE

## 2021-01-27 VITALS
WEIGHT: 159 LBS | SYSTOLIC BLOOD PRESSURE: 158 MMHG | BODY MASS INDEX: 28.17 KG/M2 | HEIGHT: 63 IN | DIASTOLIC BLOOD PRESSURE: 83 MMHG | HEART RATE: 68 BPM

## 2021-01-27 DIAGNOSIS — R06.01 ORTHOPNEA: ICD-10-CM

## 2021-01-27 DIAGNOSIS — E78.2 MIXED HYPERLIPIDEMIA: ICD-10-CM

## 2021-01-27 DIAGNOSIS — Z95.5 S/P DRUG ELUTING CORONARY STENT PLACEMENT: ICD-10-CM

## 2021-01-27 DIAGNOSIS — R06.02 SHORTNESS OF BREATH: ICD-10-CM

## 2021-01-27 DIAGNOSIS — R06.02 SOB (SHORTNESS OF BREATH): ICD-10-CM

## 2021-01-27 DIAGNOSIS — Z79.899 HIGH RISK MEDICATION USE: ICD-10-CM

## 2021-01-27 DIAGNOSIS — R00.2 PALPITATIONS: ICD-10-CM

## 2021-01-27 DIAGNOSIS — I10 ESSENTIAL HYPERTENSION: ICD-10-CM

## 2021-01-27 DIAGNOSIS — I20.0 UNSTABLE ANGINA PECTORIS (HCC): ICD-10-CM

## 2021-01-27 DIAGNOSIS — I25.118 CORONARY ARTERY DISEASE OF NATIVE ARTERY OF NATIVE HEART WITH STABLE ANGINA PECTORIS (HCC): ICD-10-CM

## 2021-01-27 DIAGNOSIS — E11.9 TYPE 2 DIABETES MELLITUS WITHOUT COMPLICATION, WITHOUT LONG-TERM CURRENT USE OF INSULIN (HCC): ICD-10-CM

## 2021-01-27 PROCEDURE — 99214 OFFICE O/P EST MOD 30 MIN: CPT | Mod: 95,CR | Performed by: INTERNAL MEDICINE

## 2021-01-27 RX ORDER — ATORVASTATIN CALCIUM 40 MG/1
40 TABLET, FILM COATED ORAL EVERY EVENING
Qty: 90 TAB | Refills: 3 | Status: SHIPPED | OUTPATIENT
Start: 2021-01-27

## 2021-01-27 RX ORDER — AMLODIPINE BESYLATE 5 MG/1
5 TABLET ORAL DAILY
Qty: 100 TAB | Refills: 3 | Status: SHIPPED | OUTPATIENT
Start: 2021-01-27

## 2021-01-27 RX ORDER — LISINOPRIL 20 MG/1
20 TABLET ORAL 2 TIMES DAILY
Qty: 200 TAB | Refills: 3 | Status: SHIPPED | OUTPATIENT
Start: 2021-01-27

## 2021-01-27 ASSESSMENT — ENCOUNTER SYMPTOMS
COUGH: 0
SPUTUM PRODUCTION: 0
CARDIOVASCULAR NEGATIVE: 1
HEMOPTYSIS: 0
ORTHOPNEA: 0
EYES NEGATIVE: 1
CLAUDICATION: 0
DIZZINESS: 0
LOSS OF CONSCIOUSNESS: 0
SORE THROAT: 0
WHEEZING: 0
CONSTITUTIONAL NEGATIVE: 1
PALPITATIONS: 0
NEUROLOGICAL NEGATIVE: 1
WEAKNESS: 0
STRIDOR: 0
SHORTNESS OF BREATH: 0
CHILLS: 0
MUSCULOSKELETAL NEGATIVE: 1
PND: 0
RESPIRATORY NEGATIVE: 1
BRUISES/BLEEDS EASILY: 0
FEVER: 0
GASTROINTESTINAL NEGATIVE: 1

## 2021-01-27 ASSESSMENT — FIBROSIS 4 INDEX: FIB4 SCORE: 1.76

## 2021-01-27 NOTE — PROGRESS NOTES
Chief Complaint   Patient presents with   • Hyperlipidemia   • HTN (Controlled)   • Coronary Artery Disease   • Shortness of Breath   • Chest Pain       Subjective:   Gilberto Solorzano is a 70 y.o. male who presents today as a follow-up for his hypertension hyperlipidemia type 2 diabetes and CAD.  Since we last saw him he continues be active.  Blood pressures controlled at home.  He checks it multiple times a day and is always under 130/90.  He has no chest pain with exertion.  He is scheduled to see his primary care physician and get some lab work done here in the next couple of weeks.    Past Medical History:   Diagnosis Date   • Breath shortness    • Diabetes (HCC)     oral medications   • Heart burn    • Hypertension      Past Surgical History:   Procedure Laterality Date   • ZZZ CARDIAC CATH  11/20/2017    Stent LAD, 2 stents to Circ.   • OTHER  2006    cervical fusion     History reviewed. No pertinent family history.  Social History     Socioeconomic History   • Marital status:      Spouse name: Not on file   • Number of children: Not on file   • Years of education: Not on file   • Highest education level: Not on file   Occupational History   • Not on file   Social Needs   • Financial resource strain: Not on file   • Food insecurity     Worry: Not on file     Inability: Not on file   • Transportation needs     Medical: Not on file     Non-medical: Not on file   Tobacco Use   • Smoking status: Never Smoker   • Smokeless tobacco: Never Used   Substance and Sexual Activity   • Alcohol use: No   • Drug use: No   • Sexual activity: Not on file   Lifestyle   • Physical activity     Days per week: Not on file     Minutes per session: Not on file   • Stress: Not on file   Relationships   • Social connections     Talks on phone: Not on file     Gets together: Not on file     Attends Islam service: Not on file     Active member of club or organization: Not on file     Attends meetings of clubs or  organizations: Not on file     Relationship status: Not on file   • Intimate partner violence     Fear of current or ex partner: Not on file     Emotionally abused: Not on file     Physically abused: Not on file     Forced sexual activity: Not on file   Other Topics Concern   • Not on file   Social History Narrative   • Not on file     No Known Allergies  Outpatient Encounter Medications as of 1/27/2021   Medication Sig Dispense Refill   • amLODIPine (NORVASC) 5 MG Tab Take 1 Tab by mouth every day. 100 Tab 3   • atorvastatin (LIPITOR) 40 MG Tab Take 1 Tab by mouth every evening. 90 Tab 3   • lisinopril (PRINIVIL) 20 MG Tab Take 1 Tab by mouth 2 times a day. 200 Tab 3   • metoprolol tartrate (LOPRESSOR) 25 MG Tab Take 1 Tab by mouth 2 times a day. 100 Tab 3   • omeprazole (PRILOSEC) 20 MG delayed-release capsule Take 20 mg by mouth every day.     • Multiple Vitamins-Minerals (CENTRUM SILVER PO) Take  by mouth.     • Cholecalciferol (VITAMIN D3) 2000 UNIT Cap Take  by mouth.     • aspirin 81 MG tablet Take 81 mg by mouth every day.     • [DISCONTINUED] metoprolol (LOPRESSOR) 25 MG Tab Take 1 tablet by mouth twice daily 100 Tab 0   • [DISCONTINUED] atorvastatin (LIPITOR) 40 MG Tab TAKE 1 TABLET BY MOUTH ONCE DAILY IN THE EVENING 100 Tab 0   • [DISCONTINUED] amLODIPine (NORVASC) 5 MG Tab Take 1 tablet by mouth once daily 100 Tab 0   • [DISCONTINUED] lisinopril (PRINIVIL) 20 MG Tab Take 1 Tab by mouth 2 times a day. 200 Tab 1   • Omega-3 Fatty Acids (FISH OIL) 1000 MG Cap capsule Take 1,000 mg by mouth every day.     • metformin ER (GLUCOPHAGE XR) 750 MG TABLET SR 24 HR Take 1,000 mg by mouth every day.       No facility-administered encounter medications on file as of 1/27/2021.      Review of Systems   Constitutional: Negative.  Negative for chills, fever and malaise/fatigue.   HENT: Negative.  Negative for sore throat.    Eyes: Negative.    Respiratory: Negative.  Negative for cough, hemoptysis, sputum production,  "shortness of breath, wheezing and stridor.    Cardiovascular: Negative.  Negative for chest pain, palpitations, orthopnea, claudication, leg swelling and PND.   Gastrointestinal: Negative.    Genitourinary: Negative.    Musculoskeletal: Negative.    Skin: Negative.    Neurological: Negative.  Negative for dizziness, loss of consciousness and weakness.   Endo/Heme/Allergies: Negative.  Does not bruise/bleed easily.   All other systems reviewed and are negative.       Objective:   /83 (BP Location: Left arm, Patient Position: Sitting, BP Cuff Size: Adult)   Pulse 68   Ht 1.6 m (5' 3\")   Wt 72.1 kg (159 lb)   BMI 28.17 kg/m²     Physical Exam   Constitutional: He is oriented to person, place, and time. He appears well-developed and well-nourished. No distress.   HENT:   Head: Normocephalic and atraumatic.   Right Ear: External ear normal.   Left Ear: External ear normal.   Nose: Nose normal.   Mouth/Throat: No oropharyngeal exudate.   Eyes: Pupils are equal, round, and reactive to light. Conjunctivae and EOM are normal. Right eye exhibits no discharge. Left eye exhibits no discharge. No scleral icterus.   Neck: Normal range of motion. Neck supple. No JVD present. No tracheal deviation present.   Cardiovascular: Normal rate.   Pulmonary/Chest: Effort normal and breath sounds normal. No stridor. No respiratory distress.   Abdominal: Soft. Bowel sounds are normal.   Musculoskeletal: Normal range of motion.         General: No tenderness, deformity or edema.   Neurological: He is alert and oriented to person, place, and time. No cranial nerve deficit. Coordination normal.   Skin: Skin is warm and dry. No rash noted. He is not diaphoretic. No erythema. No pallor.   Psychiatric: He has a normal mood and affect. His behavior is normal. Judgment and thought content normal.   Nursing note reviewed.      Assessment:     1. SOB (shortness of breath)     2. Type 2 diabetes mellitus without complication, without long-term " current use of insulin (HCC)  atorvastatin (LIPITOR) 40 MG Tab    CBC W/ DIFF W/O PLATELETS   3. Unstable angina pectoris (HCC)     4. Coronary artery disease of native artery of native heart with stable angina pectoris (HCC)  amLODIPine (NORVASC) 5 MG Tab    lisinopril (PRINIVIL) 20 MG Tab    metoprolol tartrate (LOPRESSOR) 25 MG Tab    CBC W/ DIFF W/O PLATELETS    Comp Metabolic Panel    Lipid Profile   5. Essential hypertension  amLODIPine (NORVASC) 5 MG Tab    lisinopril (PRINIVIL) 20 MG Tab    metoprolol tartrate (LOPRESSOR) 25 MG Tab    CBC W/ DIFF W/O PLATELETS    Comp Metabolic Panel    Lipid Profile   6. High risk medication use  CBC W/ DIFF W/O PLATELETS    Comp Metabolic Panel    Lipid Profile   7. Mixed hyperlipidemia  atorvastatin (LIPITOR) 40 MG Tab    CBC W/ DIFF W/O PLATELETS    Comp Metabolic Panel    Lipid Profile   8. Orthopnea     9. S/P drug eluting coronary stent placement  amLODIPine (NORVASC) 5 MG Tab   10. SOB (shortness of breath)  amLODIPine (NORVASC) 5 MG Tab    atorvastatin (LIPITOR) 40 MG Tab   11. Palpitations  atorvastatin (LIPITOR) 40 MG Tab       Medical Decision Making:  Today's Assessment / Status / Plan:     70-year-old male with type 2 diabetes hypertension hyperlipidemia and CAD.  I refilled all his medications for 1 year.  We will check labs when he goes to see his primary care physician in approximately 1 to 2 weeks.  Otherwise he is doing well with no medical changes.  We will see him back in 1 year.    Start time: 9:15  Stop time: 9:30    This evaluation was conducted via Zoom using secure and encrypted videoconferencing technology. The patient was in a private location in the [facility].    The patient's identity was confirmed and verbal consent was obtained for this virtual visit.

## 2021-02-22 ENCOUNTER — HOSPITAL ENCOUNTER (OUTPATIENT)
Dept: LAB | Facility: MEDICAL CENTER | Age: 71
End: 2021-02-22
Attending: INTERNAL MEDICINE
Payer: MEDICARE

## 2021-02-22 ENCOUNTER — HOSPITAL ENCOUNTER (OUTPATIENT)
Dept: LAB | Facility: MEDICAL CENTER | Age: 71
End: 2021-02-22
Attending: FAMILY MEDICINE
Payer: MEDICARE

## 2021-02-22 LAB
ALBUMIN SERPL BCP-MCNC: 4.1 G/DL (ref 3.2–4.9)
ALBUMIN/GLOB SERPL: 1.3 G/DL
ALP SERPL-CCNC: 63 U/L (ref 30–99)
ALT SERPL-CCNC: 23 U/L (ref 2–50)
ANION GAP SERPL CALC-SCNC: 8 MMOL/L (ref 7–16)
APPEARANCE UR: CLEAR
AST SERPL-CCNC: 21 U/L (ref 12–45)
BACTERIA #/AREA URNS HPF: NEGATIVE /HPF
BASOPHILS # BLD AUTO: 1.6 % (ref 0–1.8)
BASOPHILS # BLD: 0.09 K/UL (ref 0–0.12)
BILIRUB SERPL-MCNC: 0.7 MG/DL (ref 0.1–1.5)
BILIRUB UR QL STRIP.AUTO: NEGATIVE
BUN SERPL-MCNC: 13 MG/DL (ref 8–22)
CALCIUM SERPL-MCNC: 9.5 MG/DL (ref 8.5–10.5)
CHLORIDE SERPL-SCNC: 103 MMOL/L (ref 96–112)
CHOLEST SERPL-MCNC: 92 MG/DL (ref 100–199)
CO2 SERPL-SCNC: 25 MMOL/L (ref 20–33)
COLOR UR: YELLOW
CREAT SERPL-MCNC: 0.86 MG/DL (ref 0.5–1.4)
EOSINOPHIL # BLD AUTO: 0.36 K/UL (ref 0–0.51)
EOSINOPHIL NFR BLD: 6.3 % (ref 0–6.9)
EPI CELLS #/AREA URNS HPF: NEGATIVE /HPF
ERYTHROCYTE [DISTWIDTH] IN BLOOD BY AUTOMATED COUNT: 51.8 FL (ref 35.9–50)
EST. AVERAGE GLUCOSE BLD GHB EST-MCNC: 140 MG/DL
FASTING STATUS PATIENT QL REPORTED: NORMAL
GLOBULIN SER CALC-MCNC: 3.1 G/DL (ref 1.9–3.5)
GLUCOSE SERPL-MCNC: 106 MG/DL (ref 65–99)
GLUCOSE UR STRIP.AUTO-MCNC: NEGATIVE MG/DL
HBA1C MFR BLD: 6.5 % (ref 0–5.6)
HCT VFR BLD AUTO: 39.9 % (ref 42–52)
HDLC SERPL-MCNC: 37 MG/DL
HGB BLD-MCNC: 12.3 G/DL (ref 14–18)
HYALINE CASTS #/AREA URNS LPF: ABNORMAL /LPF
IMM GRANULOCYTES # BLD AUTO: 0.02 K/UL (ref 0–0.11)
IMM GRANULOCYTES NFR BLD AUTO: 0.4 % (ref 0–0.9)
KETONES UR STRIP.AUTO-MCNC: NEGATIVE MG/DL
LDLC SERPL CALC-MCNC: 41 MG/DL
LEUKOCYTE ESTERASE UR QL STRIP.AUTO: ABNORMAL
LYMPHOCYTES # BLD AUTO: 2.96 K/UL (ref 1–4.8)
LYMPHOCYTES NFR BLD: 51.8 % (ref 22–41)
MCH RBC QN AUTO: 25.6 PG (ref 27–33)
MCHC RBC AUTO-ENTMCNC: 30.8 G/DL (ref 33.7–35.3)
MCV RBC AUTO: 83.1 FL (ref 81.4–97.8)
MICRO URNS: ABNORMAL
MONOCYTES # BLD AUTO: 0.44 K/UL (ref 0–0.85)
MONOCYTES NFR BLD AUTO: 7.7 % (ref 0–13.4)
NEUTROPHILS # BLD AUTO: 1.84 K/UL (ref 1.82–7.42)
NEUTROPHILS NFR BLD: 32.2 % (ref 44–72)
NITRITE UR QL STRIP.AUTO: NEGATIVE
NRBC # BLD AUTO: 0 K/UL
NRBC BLD-RTO: 0 /100 WBC
PH UR STRIP.AUTO: 6 [PH] (ref 5–8)
PLATELET # BLD AUTO: 234 K/UL (ref 164–446)
PMV BLD AUTO: 12.9 FL (ref 9–12.9)
POTASSIUM SERPL-SCNC: 4.5 MMOL/L (ref 3.6–5.5)
PROT SERPL-MCNC: 7.2 G/DL (ref 6–8.2)
PROT UR QL STRIP: NEGATIVE MG/DL
PSA SERPL-MCNC: 1.27 NG/ML (ref 0–4)
RBC # BLD AUTO: 4.8 M/UL (ref 4.7–6.1)
RBC # URNS HPF: ABNORMAL /HPF
RBC UR QL AUTO: NEGATIVE
SODIUM SERPL-SCNC: 136 MMOL/L (ref 135–145)
SP GR UR STRIP.AUTO: >=1.03
TRIGL SERPL-MCNC: 68 MG/DL (ref 0–149)
TSH SERPL DL<=0.005 MIU/L-ACNC: 2.06 UIU/ML (ref 0.38–5.33)
UROBILINOGEN UR STRIP.AUTO-MCNC: 0.2 MG/DL
WBC # BLD AUTO: 5.7 K/UL (ref 4.8–10.8)
WBC #/AREA URNS HPF: ABNORMAL /HPF

## 2021-02-22 PROCEDURE — 84443 ASSAY THYROID STIM HORMONE: CPT

## 2021-02-22 PROCEDURE — 36415 COLL VENOUS BLD VENIPUNCTURE: CPT

## 2021-02-22 PROCEDURE — 85025 COMPLETE CBC W/AUTO DIFF WBC: CPT

## 2021-02-22 PROCEDURE — 80061 LIPID PANEL: CPT

## 2021-02-22 PROCEDURE — 83036 HEMOGLOBIN GLYCOSYLATED A1C: CPT

## 2021-02-22 PROCEDURE — 80053 COMPREHEN METABOLIC PANEL: CPT

## 2021-02-22 PROCEDURE — 81001 URINALYSIS AUTO W/SCOPE: CPT

## 2021-02-22 PROCEDURE — 84153 ASSAY OF PSA TOTAL: CPT

## 2021-05-21 ENCOUNTER — HOSPITAL ENCOUNTER (OUTPATIENT)
Dept: LAB | Facility: MEDICAL CENTER | Age: 71
End: 2021-05-21
Attending: INTERNAL MEDICINE
Payer: MEDICARE

## 2021-05-21 PROCEDURE — 83013 H PYLORI (C-13) BREATH: CPT

## 2021-05-24 LAB — UREA BREATH TEST QL: NEGATIVE

## 2021-07-10 ENCOUNTER — OFFICE VISIT (OUTPATIENT)
Dept: URGENT CARE | Facility: PHYSICIAN GROUP | Age: 71
End: 2021-07-10
Payer: MEDICARE

## 2021-07-10 ENCOUNTER — HOSPITAL ENCOUNTER (OUTPATIENT)
Dept: RADIOLOGY | Facility: MEDICAL CENTER | Age: 71
End: 2021-07-10
Attending: NURSE PRACTITIONER
Payer: MEDICARE

## 2021-07-10 VITALS
TEMPERATURE: 97.8 F | HEART RATE: 58 BPM | OXYGEN SATURATION: 97 % | SYSTOLIC BLOOD PRESSURE: 148 MMHG | HEIGHT: 63 IN | DIASTOLIC BLOOD PRESSURE: 78 MMHG | RESPIRATION RATE: 20 BRPM | BODY MASS INDEX: 28 KG/M2 | WEIGHT: 158 LBS

## 2021-07-10 DIAGNOSIS — M79.604 LOWER EXTREMITY PAIN, POSTERIOR, RIGHT: ICD-10-CM

## 2021-07-10 DIAGNOSIS — M25.561 PAIN AND SWELLING OF RIGHT KNEE: ICD-10-CM

## 2021-07-10 DIAGNOSIS — M25.461 PAIN AND SWELLING OF RIGHT KNEE: ICD-10-CM

## 2021-07-10 PROCEDURE — 99203 OFFICE O/P NEW LOW 30 MIN: CPT | Performed by: NURSE PRACTITIONER

## 2021-07-10 PROCEDURE — 93971 EXTREMITY STUDY: CPT | Mod: RT

## 2021-07-10 ASSESSMENT — ENCOUNTER SYMPTOMS
PND: 0
ORTHOPNEA: 0
CHILLS: 0
FEVER: 0
PALPITATIONS: 0
COUGH: 0
CLAUDICATION: 0
WHEEZING: 0
SHORTNESS OF BREATH: 0

## 2021-07-10 ASSESSMENT — FIBROSIS 4 INDEX: FIB4 SCORE: 1.37

## 2021-07-10 NOTE — PROGRESS NOTES
Subjective:   Gilberto Solorzano is a 71 y.o. male who presents for Fall (2x wks; right leg- lump on knee and brusing through out leg. Pt states he did not hit his head and remembers falling. )      HPI  71-year-old male patient in urgent care status post fall 2 weeks ago.  Patient states he has noticed increasing pain, dependent bruising and a lump on the right lateral knee.  Patient has some pain with ambulation.  Denies any paresthesias.  Denies any previous injury to the area.  Patient is currently on Plavix status post stent placement due to coronary artery disease.  Patient has cardiac history including MI.  Patient reports positive family history of MI on father side.  Denies stroke, history of PE, DVT or family history of stroke, PE, DVT.  Patient is not currently taking anything for pain.  Denies shortness of breath, wheezing, difficulty breathing, chest pain    Review of Systems   Constitutional: Negative for chills and fever.   Respiratory: Negative for cough, shortness of breath and wheezing.    Cardiovascular: Negative for chest pain, palpitations, orthopnea, claudication, leg swelling and PND.   Musculoskeletal: Positive for joint pain.   All other systems reviewed and are negative.      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   • OTHER  2006    cervical fusion     Social History     Tobacco Use   • Smoking status: Never Smoker   • Smokeless tobacco: Never Used   Vaping Use   • Vaping Use: Never used   Substance Use Topics   • Alcohol use:  "No   • Drug use: No      History reviewed. No pertinent family history.   (Allergies, Medications, & Tobacco/Substance Use were reconciled by the Medical Assistant and reviewed by myself. The family history is prepopulated)     Objective:     /78 (BP Location: Right arm, Patient Position: Sitting, BP Cuff Size: Adult)   Pulse (!) 58   Temp 36.6 °C (97.8 °F) (Temporal)   Resp 20   Ht 1.6 m (5' 3\")   Wt 71.7 kg (158 lb)   SpO2 97%   BMI 27.99 kg/m²     Physical Exam  Vitals reviewed.   Constitutional:       Appearance: Normal appearance.   Cardiovascular:      Rate and Rhythm: Normal rate and regular rhythm.      Pulses: Normal pulses.      Heart sounds: Normal heart sounds, S1 normal and S2 normal.      Comments: Wells criteria - 1: pain with palpation to posterior right knee   Pulmonary:      Effort: Pulmonary effort is normal.      Breath sounds: Normal breath sounds.   Musculoskeletal:      Right knee: No swelling, deformity, effusion, erythema, ecchymosis, lacerations, bony tenderness or crepitus. Normal range of motion. Tenderness present. No LCL laxity, MCL laxity, ACL laxity or PCL laxity. Normal alignment, normal meniscus and normal patellar mobility. Normal pulse.      Instability Tests: Anterior drawer test negative. Posterior drawer test negative. Anterior Lachman test negative. Medial Yaz test negative and lateral Yaz test negative.      Right lower leg: No edema.      Left lower leg: No edema.        Legs:       Comments: Tenderness to right lateral posterior knee   Skin:     General: Skin is warm.      Capillary Refill: Capillary refill takes less than 2 seconds.   Neurological:      Mental Status: He is alert and oriented to person, place, and time.   Psychiatric:         Mood and Affect: Mood normal.         Behavior: Behavior normal.         Thought Content: Thought content normal.         Judgment: Judgment normal.         Assessment/Plan:     1. Lower extremity pain, " posterior, right  US-EXTREMITY VENOUS LOWER UNILAT RIGHT    CANCELED: US-EXTREMITY VENOUS LOWER UNILAT RIGHT   2. Hematoma of right knee region  REFERRAL TO SPORTS MEDICINE     Discussed physical examination findings as well as patient presentation, length patient symptoms, mechanism of injury is consistent with right knee pain with dependent bruising.  Will send referral to sports medicine for right lateral fluid accumulation and discussed differential diagnosis of bursitis versus hematoma.  Discussed that both are generally self-limiting and he can take over-the-counter Tylenol, rest, elevate area.    Due to posterior right knee pain will order ultrasound to rule out DVT.  Patient is scheduled outpatient at 4:15 PM.  I will call patient with results and changes to plan of care at that time however patient is already on Plavix.    Patient expressed understanding agrees to plan is no further questions at this time.    Differential diagnosis, natural history, supportive care, and indications for immediate follow-up discussed.    Advised the patient to follow-up with the primary care physician for recheck, reevaluation, and consideration of further management.    Please note that this dictation was created using voice recognition software. I have made a reasonable attempt to correct obvious errors, but I expect that there are errors of grammar and possibly content that I did not discover before finalizing the note.    This note was electronically signed KARL Chao

## 2021-07-11 ENCOUNTER — TELEPHONE (OUTPATIENT)
Dept: URGENT CARE | Facility: CLINIC | Age: 71
End: 2021-07-11

## 2021-07-11 NOTE — TELEPHONE ENCOUNTER
Called and spoke with patient regarding ultrasound results.  Advised that he does not have evidence of a DVT and appears that fluid collection to the right lateral portion of his leg is consistent with hematoma.  Discussed that this is self-limiting but he may go to sports medicine for which a referral was placed during visit yesterday.  Patient expressed understanding agrees plan is no further questions at this time.       Please note that this dictation was created using voice recognition software. I have made every reasonable attempt to correct obvious errors, but I expect that there are errors of grammar and possibly content that I did not discover before finalizing the note.    Note electronically signed by KARL Chao

## 2021-07-19 ENCOUNTER — APPOINTMENT (OUTPATIENT)
Dept: RADIOLOGY | Facility: IMAGING CENTER | Age: 71
End: 2021-07-19
Attending: FAMILY MEDICINE
Payer: MEDICARE

## 2021-07-19 ENCOUNTER — OFFICE VISIT (OUTPATIENT)
Dept: MEDICAL GROUP | Facility: CLINIC | Age: 71
End: 2021-07-19
Payer: MEDICARE

## 2021-07-19 VITALS
OXYGEN SATURATION: 95 % | HEIGHT: 63 IN | WEIGHT: 158 LBS | RESPIRATION RATE: 18 BRPM | SYSTOLIC BLOOD PRESSURE: 132 MMHG | DIASTOLIC BLOOD PRESSURE: 74 MMHG | TEMPERATURE: 98.4 F | HEART RATE: 82 BPM | BODY MASS INDEX: 28 KG/M2

## 2021-07-19 DIAGNOSIS — S70.11XA HEMATOMA OF THIGH, RIGHT, INITIAL ENCOUNTER: ICD-10-CM

## 2021-07-19 DIAGNOSIS — M25.561 ACUTE PAIN OF RIGHT KNEE: ICD-10-CM

## 2021-07-19 PROCEDURE — 73564 X-RAY EXAM KNEE 4 OR MORE: CPT | Mod: TC,RT | Performed by: FAMILY MEDICINE

## 2021-07-19 PROCEDURE — 99203 OFFICE O/P NEW LOW 30 MIN: CPT | Performed by: FAMILY MEDICINE

## 2021-07-19 ASSESSMENT — FIBROSIS 4 INDEX: FIB4 SCORE: 1.37

## 2021-07-19 NOTE — PROGRESS NOTES
CHIEF COMPLAINT:  Gilberto Solorzano male presenting at the request of KARL Chao  for evaluation of knee pain.     Gilberto Solorzano is complaining of right knee pain  DOI, 6/27/2021  Getting out of tub at home and caught leg causing him to fall back into the tub and possibly striking the edge of the tub with his RIGHT thigh/leg  Pain is at the superior and lateral knee  Quality is aching  Pain is non-radiating  Improved with resting and after moving for a while  Aggravated by 1st getting up after seated  no prior problems with this area in the past   Prior Treatments: seen at   Prior studies: NO Prior imaging has been done   Medications tried for pain include: topical cream/arnica  Mechanical Symptom history: No Locking or popping    Shopping, routine activities    REVIEW OF SYSTEMS  No Nausea, No Vomiting, No Chest Pain, No Shortness of Breath, No Dizziness, No Headache      PAST MEDICAL HISTORY:   History reviewed. No pertinent past medical history.    PMH:  has a past medical history of Breath shortness, Diabetes (HCC), Heart burn, and Hypertension.  MEDS:   Current Outpatient Medications:   •  amLODIPine (NORVASC) 5 MG Tab, Take 1 Tab by mouth every day., Disp: 100 Tab, Rfl: 3  •  atorvastatin (LIPITOR) 40 MG Tab, Take 1 Tab by mouth every evening., Disp: 90 Tab, Rfl: 3  •  lisinopril (PRINIVIL) 20 MG Tab, Take 1 Tab by mouth 2 times a day., Disp: 200 Tab, Rfl: 3  •  metoprolol tartrate (LOPRESSOR) 25 MG Tab, Take 1 Tab by mouth 2 times a day., Disp: 100 Tab, Rfl: 3  •  Omega-3 Fatty Acids (FISH OIL) 1000 MG Cap capsule, Take 1,000 mg by mouth every day., Disp: , Rfl:   •  metformin ER (GLUCOPHAGE XR) 750 MG TABLET SR 24 HR, Take 1,000 mg by mouth every day., Disp: , Rfl:   •  omeprazole (PRILOSEC) 20 MG delayed-release capsule, Take 20 mg by mouth every day., Disp: , Rfl:   •  Multiple Vitamins-Minerals (CENTRUM SILVER PO), Take  by mouth., Disp: , Rfl:   •  Cholecalciferol (VITAMIN D3)  "2000 UNIT Cap, Take  by mouth., Disp: , Rfl:   •  aspirin 81 MG tablet, Take 81 mg by mouth every day., Disp: , Rfl:   ALLERGIES: No Known Allergies  SURGHX:   Past Surgical History:   Procedure Laterality Date   • [facility] CARDIAC CATH  11/20/2017    Stent LAD, 2 stents to Circ.   • OTHER  2006    cervical fusion     SOCHX:  reports that he has never smoked. He has never used smokeless tobacco. He reports that he does not drink alcohol and does not use drugs.  FH: Family history was reviewed, no pertinent findings to report     PHYSICAL EXAM:  /74 (BP Location: Left arm, Patient Position: Sitting, BP Cuff Size: Adult)   Pulse 82   Temp 36.9 °C (98.4 °F) (Temporal)   Resp 18   Ht 1.6 m (5' 3\")   Wt 71.7 kg (158 lb)   SpO2 95%   BMI 27.99 kg/m²      well-developed, well-nourished in no apparent distress, alert and oriented x 3.  Gait: normal     RIGHT Knee:  Slight Varus and Swelling Noted along the distal vastus lateralis region just above the knee, golf ball sized soft tissue mass  Range of Motion Intact  Trace effusion  Patellar No tenderness and no apprehension  Medial Joint Line Non-tender and NEGATIVE Yaz  Lateral Joint Line Non-tender and NEGATIVE Yaz  Trace Laxity with Varus stress  Trace Laxity with Valgus stress  Lachman's testing is Trace  Posterior Drawer Testing is Trace  The leg is otherwise neurovascularly intact    LEFT Knee:  Slight Varus and No Swelling   Range of Motion Intact  Trace effusion  Patellar No tenderness and no apprehension  Medial Joint Line Non-tender and NEGATIVE Yaz  Lateral Joint Line Non-tender and NEGATIVE Yaz  Trace Laxity with Varus stress  Trace Laxity with Valgus stress  Lachman's testing is Trace  Posterior Drawer Testing is Trace  The leg is otherwise neurovascularly intact    Additional Findings: None      1. Acute pain of right knee  DX-KNEE COMPLETE 4+ RIGHT   2. Hematoma of thigh, right, initial encounter       DOI, 6/27/2021  Getting out of " tub at home and caught leg causing him to fall back into the tub and possibly striking the edge of the tub with his RIGHT thigh/leg    Fortunately, knee does not demonstrate any significant findings and he has fairly good range of motion  Suspect his symptoms are coming from the hematoma of the lateral distal thigh    Recommended massage, continued use of Arnica gel    Healing at a good pace at this point, we will have him follow-up as needed.          7/19/2021 9:16 AM     HISTORY/REASON FOR EXAM:  Pain/Deformity Following Trauma.     TECHNIQUE/EXAM DESCRIPTION AND NUMBER OF VIEWS:  4 views of the RIGHT knee.     COMPARISON: None     FINDINGS:  Bones: Normal bone mineralization. No fracture. No focal bone lesion.     Joints: Joint spaces are preserved. No subluxation. No joint effusion. No significant degenerative changes.     Other: Soft tissue swelling superior and lateral to the patella. Arteriosclerosis.     IMPRESSION:     1. Anterolateral right knee soft tissue swelling superior to the patella.  2. No acute fracture or subluxation.  3. No joint effusion.      taken here and reviewed by me    Thank you KARL Chao for allowing me to participate in caring for your patient.

## 2021-07-19 NOTE — Clinical Note
Romeo Peterson,  We saw Gilberto in the office today for his knee injury.  Fortunately, the knee seems to be structurally intact.  He seems to have only the localized hematoma which seems to be getting smaller.  He has no limitation of range of motion and function is intact.  Therefore, we will let the hematoma reabsorb over time.  Since he is doing so well at this point, we decided to have him follow-up on an as-needed basis.  Hope you are well!  L

## 2021-08-26 ENCOUNTER — PATIENT MESSAGE (OUTPATIENT)
Dept: HEALTH INFORMATION MANAGEMENT | Facility: OTHER | Age: 71
End: 2021-08-26

## 2021-09-21 DIAGNOSIS — I25.118 CORONARY ARTERY DISEASE OF NATIVE ARTERY OF NATIVE HEART WITH STABLE ANGINA PECTORIS (HCC): ICD-10-CM

## 2021-09-21 DIAGNOSIS — I10 ESSENTIAL HYPERTENSION: ICD-10-CM

## 2021-09-23 DIAGNOSIS — I25.118 CORONARY ARTERY DISEASE OF NATIVE ARTERY OF NATIVE HEART WITH STABLE ANGINA PECTORIS (HCC): ICD-10-CM

## 2021-09-23 DIAGNOSIS — I10 ESSENTIAL HYPERTENSION: ICD-10-CM

## 2021-11-11 ENCOUNTER — TELEPHONE (OUTPATIENT)
Dept: HEALTH INFORMATION MANAGEMENT | Facility: OTHER | Age: 71
End: 2021-11-11

## 2021-11-11 NOTE — TELEPHONE ENCOUNTER
Outcome: Patient unavailable will call back to schedule comprehensive health assessment.     Please transfer to Patient Outreach Team at [phone] when patient returns call.    [facility] Verified: yes    Attempt # 2

## 2021-11-16 NOTE — TELEPHONE ENCOUNTER
Outcome: Declined Comprehensive Health Assessment.     Please transfer to Patient Outreach Team at [phone] when patient returns call.      [facility] Verified: yes    Attempt # 3

## 2022-09-08 NOTE — PROGRESS NOTES
Third outreach call placed. Left message to call if interested in the [facility] Program, contact number provided. Letter and brochure mailed out previously.    n/a

## 2023-05-21 NOTE — CATH LAB
Immediate Post-Operative Note      PreOp Diagnosis: Angina with positive stress MPI    PostOp Diagnosis: Two vessel CAD (99% mid LAD, 90% distal dominant LCX, 70% mid LCX) NL EF  S/p 2.5x15 Xience CARLO to mid LAD  S/p 2.5x12 mm Xience CARLO and 4x12 Vision bare metal to LCX  No residual stenosis, MARIELLE III    Procedure(s) :  Coronary Angiography, Left Heart Catheterization, Left Ventriculography  PCI LAD and LCX    Surgeon(s):  Fidel Wick M.D.    Type of Anesthesia: Moderate Sedation    Specimen: None    Estimated Blood Loss: 10 cc's    Findings: As above    Complications: none      Fidel Wick M.D.  11/20/2017 8:16 AM    
No